# Patient Record
Sex: MALE | Race: WHITE | NOT HISPANIC OR LATINO | URBAN - METROPOLITAN AREA
[De-identification: names, ages, dates, MRNs, and addresses within clinical notes are randomized per-mention and may not be internally consistent; named-entity substitution may affect disease eponyms.]

---

## 2018-03-21 ENCOUNTER — OUTPATIENT (OUTPATIENT)
Dept: OUTPATIENT SERVICES | Facility: HOSPITAL | Age: 28
LOS: 1 days | End: 2018-03-21

## 2018-03-21 ENCOUNTER — APPOINTMENT (OUTPATIENT)
Dept: ULTRASOUND IMAGING | Facility: CLINIC | Age: 28
End: 2018-03-21
Payer: COMMERCIAL

## 2018-03-21 DIAGNOSIS — R10.30 LOWER ABDOMINAL PAIN, UNSPECIFIED: ICD-10-CM

## 2018-03-21 PROCEDURE — 76775 US EXAM ABDO BACK WALL LIM: CPT | Mod: 26

## 2018-06-27 ENCOUNTER — TRANSCRIPTION ENCOUNTER (OUTPATIENT)
Age: 28
End: 2018-06-27

## 2018-07-02 ENCOUNTER — APPOINTMENT (OUTPATIENT)
Dept: SURGERY | Facility: CLINIC | Age: 28
End: 2018-07-02
Payer: COMMERCIAL

## 2018-07-02 ENCOUNTER — FORM ENCOUNTER (OUTPATIENT)
Age: 28
End: 2018-07-02

## 2018-07-02 VITALS
HEART RATE: 81 BPM | TEMPERATURE: 97.9 F | DIASTOLIC BLOOD PRESSURE: 80 MMHG | HEIGHT: 67 IN | RESPIRATION RATE: 16 BRPM | BODY MASS INDEX: 44.18 KG/M2 | WEIGHT: 281.5 LBS | OXYGEN SATURATION: 97 % | SYSTOLIC BLOOD PRESSURE: 137 MMHG

## 2018-07-02 DIAGNOSIS — Z86.39 PERSONAL HISTORY OF OTHER ENDOCRINE, NUTRITIONAL AND METABOLIC DISEASE: ICD-10-CM

## 2018-07-02 DIAGNOSIS — Z80.1 FAMILY HISTORY OF MALIGNANT NEOPLASM OF TRACHEA, BRONCHUS AND LUNG: ICD-10-CM

## 2018-07-02 DIAGNOSIS — Z78.9 OTHER SPECIFIED HEALTH STATUS: ICD-10-CM

## 2018-07-02 DIAGNOSIS — K21.9 GASTRO-ESOPHAGEAL REFLUX DISEASE W/OUT ESOPHAGITIS: ICD-10-CM

## 2018-07-02 DIAGNOSIS — Z80.0 FAMILY HISTORY OF MALIGNANT NEOPLASM OF DIGESTIVE ORGANS: ICD-10-CM

## 2018-07-02 DIAGNOSIS — K08.409 PARTIAL LOSS OF TEETH, UNSPECIFIED CAUSE, UNSPECIFIED CLASS: ICD-10-CM

## 2018-07-02 DIAGNOSIS — Z86.79 PERSONAL HISTORY OF OTHER DISEASES OF THE CIRCULATORY SYSTEM: ICD-10-CM

## 2018-07-02 PROCEDURE — 99205 OFFICE O/P NEW HI 60 MIN: CPT

## 2018-07-03 ENCOUNTER — APPOINTMENT (OUTPATIENT)
Dept: ULTRASOUND IMAGING | Facility: CLINIC | Age: 28
End: 2018-07-03

## 2018-07-03 ENCOUNTER — NON-APPOINTMENT (OUTPATIENT)
Age: 28
End: 2018-07-03

## 2018-07-03 ENCOUNTER — OUTPATIENT (OUTPATIENT)
Dept: OUTPATIENT SERVICES | Facility: HOSPITAL | Age: 28
LOS: 1 days | End: 2018-07-03
Payer: COMMERCIAL

## 2018-07-03 ENCOUNTER — APPOINTMENT (OUTPATIENT)
Dept: CARDIOLOGY | Facility: CLINIC | Age: 28
End: 2018-07-03
Payer: COMMERCIAL

## 2018-07-03 VITALS
DIASTOLIC BLOOD PRESSURE: 87 MMHG | WEIGHT: 281 LBS | SYSTOLIC BLOOD PRESSURE: 131 MMHG | BODY MASS INDEX: 44.1 KG/M2 | HEART RATE: 77 BPM | HEIGHT: 67 IN | OXYGEN SATURATION: 96 %

## 2018-07-03 DIAGNOSIS — R94.31 ABNORMAL ELECTROCARDIOGRAM [ECG] [EKG]: ICD-10-CM

## 2018-07-03 DIAGNOSIS — Z60.2 PROBLEMS RELATED TO LIVING ALONE: ICD-10-CM

## 2018-07-03 DIAGNOSIS — F17.200 NICOTINE DEPENDENCE, UNSPECIFIED, UNCOMPLICATED: ICD-10-CM

## 2018-07-03 PROCEDURE — 93000 ELECTROCARDIOGRAM COMPLETE: CPT

## 2018-07-03 PROCEDURE — 76700 US EXAM ABDOM COMPLETE: CPT | Mod: 26

## 2018-07-03 PROCEDURE — 99204 OFFICE O/P NEW MOD 45 MIN: CPT

## 2018-07-03 SDOH — SOCIAL STABILITY - SOCIAL INSECURITY: PROBLEMS RELATED TO LIVING ALONE: Z60.2

## 2018-08-16 ENCOUNTER — OUTPATIENT (OUTPATIENT)
Dept: OUTPATIENT SERVICES | Facility: HOSPITAL | Age: 28
LOS: 1 days | End: 2018-08-16
Payer: COMMERCIAL

## 2018-08-16 DIAGNOSIS — G47.30 SLEEP APNEA, UNSPECIFIED: ICD-10-CM

## 2018-08-16 PROCEDURE — 95810 POLYSOM 6/> YRS 4/> PARAM: CPT

## 2018-08-16 PROCEDURE — 95810 POLYSOM 6/> YRS 4/> PARAM: CPT | Mod: 26

## 2018-09-18 ENCOUNTER — APPOINTMENT (OUTPATIENT)
Dept: PULMONOLOGY | Facility: CLINIC | Age: 28
End: 2018-09-18
Payer: COMMERCIAL

## 2018-10-22 ENCOUNTER — APPOINTMENT (OUTPATIENT)
Dept: PULMONOLOGY | Facility: CLINIC | Age: 28
End: 2018-10-22
Payer: COMMERCIAL

## 2018-10-22 VITALS
RESPIRATION RATE: 16 BRPM | HEART RATE: 78 BPM | HEIGHT: 67 IN | OXYGEN SATURATION: 98 % | WEIGHT: 286 LBS | SYSTOLIC BLOOD PRESSURE: 134 MMHG | BODY MASS INDEX: 44.89 KG/M2 | DIASTOLIC BLOOD PRESSURE: 84 MMHG

## 2018-10-22 DIAGNOSIS — Z01.818 ENCOUNTER FOR OTHER PREPROCEDURAL EXAMINATION: ICD-10-CM

## 2018-10-22 DIAGNOSIS — Z00.00 ENCOUNTER FOR GENERAL ADULT MEDICAL EXAMINATION W/OUT ABNORMAL FINDINGS: ICD-10-CM

## 2018-10-22 DIAGNOSIS — Z87.891 PERSONAL HISTORY OF NICOTINE DEPENDENCE: ICD-10-CM

## 2018-10-22 DIAGNOSIS — J45.990 EXERCISE INDUCED BRONCHOSPASM: ICD-10-CM

## 2018-10-22 DIAGNOSIS — E66.01 MORBID (SEVERE) OBESITY DUE TO EXCESS CALORIES: ICD-10-CM

## 2018-10-22 DIAGNOSIS — R06.02 SHORTNESS OF BREATH: ICD-10-CM

## 2018-10-22 DIAGNOSIS — R06.83 SNORING: ICD-10-CM

## 2018-10-22 DIAGNOSIS — G47.33 OBSTRUCTIVE SLEEP APNEA (ADULT) (PEDIATRIC): ICD-10-CM

## 2018-10-22 PROCEDURE — 94010 BREATHING CAPACITY TEST: CPT

## 2018-10-22 PROCEDURE — 94729 DIFFUSING CAPACITY: CPT

## 2018-10-22 PROCEDURE — 99204 OFFICE O/P NEW MOD 45 MIN: CPT | Mod: 25

## 2018-10-22 PROCEDURE — 85018 HEMOGLOBIN: CPT | Mod: QW

## 2018-10-22 PROCEDURE — 94727 GAS DIL/WSHOT DETER LNG VOL: CPT

## 2019-01-11 ENCOUNTER — APPOINTMENT (OUTPATIENT)
Dept: UROLOGY | Facility: CLINIC | Age: 29
End: 2019-01-11
Payer: COMMERCIAL

## 2019-01-11 VITALS
HEART RATE: 99 BPM | SYSTOLIC BLOOD PRESSURE: 140 MMHG | DIASTOLIC BLOOD PRESSURE: 95 MMHG | BODY MASS INDEX: 44.89 KG/M2 | WEIGHT: 286 LBS | TEMPERATURE: 98.6 F | OXYGEN SATURATION: 96 % | HEIGHT: 67 IN

## 2019-01-11 DIAGNOSIS — R10.9 UNSPECIFIED ABDOMINAL PAIN: ICD-10-CM

## 2019-01-11 DIAGNOSIS — R39.198 OTHER DIFFICULTIES WITH MICTURITION: ICD-10-CM

## 2019-01-11 LAB
BILIRUB UR QL STRIP: NEGATIVE
CLARITY UR: CLEAR
COLLECTION METHOD: NORMAL
GLUCOSE UR-MCNC: NEGATIVE
HCG UR QL: 0.2 EU/DL
HGB UR QL STRIP.AUTO: NEGATIVE
KETONES UR-MCNC: NEGATIVE
LEUKOCYTE ESTERASE UR QL STRIP: NEGATIVE
NITRITE UR QL STRIP: NEGATIVE
PH UR STRIP: 5.5
PROT UR STRIP-MCNC: NEGATIVE
SP GR UR STRIP: 1.01

## 2019-01-11 PROCEDURE — 99204 OFFICE O/P NEW MOD 45 MIN: CPT | Mod: 25

## 2019-01-11 PROCEDURE — 81003 URINALYSIS AUTO W/O SCOPE: CPT | Mod: QW

## 2019-01-11 NOTE — END OF VISIT
[FreeTextEntry3] : He will start a course of antibiotics and one-week course of alpha blockade with tamsulosin. A CAT scan is ordered and he will followup here in one week or as

## 2019-01-11 NOTE — HISTORY OF PRESENT ILLNESS
[FreeTextEntry1] : This patient had back and flank pain of limited duration on the left side one month ago. More recently developed pain on urination and notes extreme discomfort in the penis intermittently. He denies any recent sexual relation

## 2019-01-11 NOTE — REVIEW OF SYSTEMS
[Pain during urination] : pain during urination [Joint Pain] : joint pain [Swollen Glands] : swollen glands [see HPI] : see HPI [Negative] : Heme/Lymph

## 2019-01-12 LAB
APPEARANCE: CLEAR
BACTERIA: NEGATIVE
BILIRUBIN URINE: NEGATIVE
BLOOD URINE: NEGATIVE
COLOR: YELLOW
GLUCOSE QUALITATIVE U: NEGATIVE MG/DL
HYALINE CASTS: 0 /LPF
KETONES URINE: NEGATIVE
LEUKOCYTE ESTERASE URINE: NEGATIVE
MICROSCOPIC-UA: NORMAL
NITRITE URINE: NEGATIVE
PH URINE: 5.5
PROTEIN URINE: NEGATIVE MG/DL
RED BLOOD CELLS URINE: 0 /HPF
SPECIFIC GRAVITY URINE: 1.01
SQUAMOUS EPITHELIAL CELLS: 0 /HPF
UROBILINOGEN URINE: NEGATIVE MG/DL
WHITE BLOOD CELLS URINE: 0 /HPF

## 2019-01-13 ENCOUNTER — FORM ENCOUNTER (OUTPATIENT)
Age: 29
End: 2019-01-13

## 2019-01-14 ENCOUNTER — LABORATORY RESULT (OUTPATIENT)
Age: 29
End: 2019-01-14

## 2019-01-14 ENCOUNTER — OUTPATIENT (OUTPATIENT)
Dept: OUTPATIENT SERVICES | Facility: HOSPITAL | Age: 29
LOS: 1 days | End: 2019-01-14
Payer: COMMERCIAL

## 2019-01-14 ENCOUNTER — APPOINTMENT (OUTPATIENT)
Dept: CT IMAGING | Facility: CLINIC | Age: 29
End: 2019-01-14

## 2019-01-14 DIAGNOSIS — N48.89 OTHER SPECIFIED DISORDERS OF PENIS: ICD-10-CM

## 2019-01-14 LAB — BACTERIA UR CULT: NORMAL

## 2019-01-14 PROCEDURE — 74178 CT ABD&PLV WO CNTR FLWD CNTR: CPT | Mod: 26

## 2019-01-16 ENCOUNTER — APPOINTMENT (OUTPATIENT)
Age: 29
End: 2019-01-16
Payer: COMMERCIAL

## 2019-01-16 DIAGNOSIS — K59.4 ANAL SPASM: ICD-10-CM

## 2019-01-16 PROCEDURE — 99213 OFFICE O/P EST LOW 20 MIN: CPT

## 2019-01-16 NOTE — REVIEW OF SYSTEMS
[see HPI] : see HPI [Pain during urination] : pain during urination [Joint Pain] : joint pain [Swollen Glands] : swollen glands [Negative] : Heme/Lymph

## 2019-01-16 NOTE — HISTORY OF PRESENT ILLNESS
[FreeTextEntry1] : This patient continues to note pain in the penis.  This CAT scan and urine are completely normal and he remains on antibiotics. He states that he has not noted any relief at all. He states that his urinary stream is good but that there is some dribbling out the side as he urinates

## 2019-01-16 NOTE — ASSESSMENT
[FreeTextEntry1] : I will start alprazolam in case this is a manifestation of levator syndrome and have recommended surgery fluoroscopy to be done tomorrow to make sure there is not a stone or distal urethral stricture

## 2019-01-17 ENCOUNTER — APPOINTMENT (OUTPATIENT)
Age: 29
End: 2019-01-17
Payer: COMMERCIAL

## 2019-01-17 VITALS — SYSTOLIC BLOOD PRESSURE: 133 MMHG | HEART RATE: 98 BPM | DIASTOLIC BLOOD PRESSURE: 87 MMHG

## 2019-01-17 DIAGNOSIS — N48.89 OTHER SPECIFIED DISORDERS OF PENIS: ICD-10-CM

## 2019-01-17 PROCEDURE — 52000 CYSTOURETHROSCOPY: CPT

## 2019-01-22 ENCOUNTER — APPOINTMENT (OUTPATIENT)
Age: 29
End: 2019-01-22
Payer: COMMERCIAL

## 2019-02-07 ENCOUNTER — APPOINTMENT (OUTPATIENT)
Dept: DERMATOLOGY | Facility: CLINIC | Age: 29
End: 2019-02-07
Payer: COMMERCIAL

## 2019-02-07 PROCEDURE — 99203 OFFICE O/P NEW LOW 30 MIN: CPT

## 2019-11-19 ENCOUNTER — APPOINTMENT (OUTPATIENT)
Dept: ULTRASOUND IMAGING | Facility: CLINIC | Age: 29
End: 2019-11-19
Payer: COMMERCIAL

## 2019-11-19 ENCOUNTER — OUTPATIENT (OUTPATIENT)
Dept: OUTPATIENT SERVICES | Facility: HOSPITAL | Age: 29
LOS: 1 days | End: 2019-11-19

## 2019-11-19 ENCOUNTER — TRANSCRIPTION ENCOUNTER (OUTPATIENT)
Age: 29
End: 2019-11-19

## 2019-11-19 DIAGNOSIS — R10.11 RIGHT UPPER QUADRANT PAIN: ICD-10-CM

## 2019-11-19 PROCEDURE — 76700 US EXAM ABDOM COMPLETE: CPT | Mod: 26

## 2019-11-20 ENCOUNTER — APPOINTMENT (OUTPATIENT)
Dept: GASTROENTEROLOGY | Facility: CLINIC | Age: 29
End: 2019-11-20
Payer: COMMERCIAL

## 2019-11-20 ENCOUNTER — TRANSCRIPTION ENCOUNTER (OUTPATIENT)
Age: 29
End: 2019-11-20

## 2019-11-20 VITALS
BODY MASS INDEX: 35.79 KG/M2 | RESPIRATION RATE: 16 BRPM | DIASTOLIC BLOOD PRESSURE: 82 MMHG | HEIGHT: 67 IN | SYSTOLIC BLOOD PRESSURE: 124 MMHG | HEART RATE: 86 BPM | OXYGEN SATURATION: 98 % | WEIGHT: 228 LBS

## 2019-11-20 DIAGNOSIS — K80.50 CALCULUS OF BILE DUCT W/OUT CHOLANGITIS OR CHOLECYSTITIS W/OUT OBSTRUCTION: ICD-10-CM

## 2019-11-20 DIAGNOSIS — K80.20 CALCULUS OF GALLBLADDER W/OUT CHOLECYSTITIS W/OUT OBSTRUCTION: ICD-10-CM

## 2019-11-20 PROCEDURE — 99204 OFFICE O/P NEW MOD 45 MIN: CPT

## 2019-11-20 RX ORDER — CETIRIZINE HYDROCHLORIDE 10 MG/1
CAPSULE, LIQUID FILLED ORAL
Refills: 0 | Status: COMPLETED | COMMUNITY
End: 2019-11-20

## 2019-11-20 RX ORDER — CIPROFLOXACIN HYDROCHLORIDE 500 MG/1
500 TABLET, FILM COATED ORAL
Qty: 20 | Refills: 0 | Status: COMPLETED | COMMUNITY
Start: 2019-01-11 | End: 2019-11-20

## 2019-11-20 RX ORDER — ALPRAZOLAM 0.5 MG/1
0.5 TABLET ORAL TWICE DAILY
Qty: 60 | Refills: 0 | Status: COMPLETED | COMMUNITY
Start: 2019-01-16 | End: 2019-11-20

## 2019-11-20 RX ORDER — DEXTROAMPHETAMINE SACCHARATE, AMPHETAMINE ASPARTATE, DEXTROAMPHETAMINE SULFATE, AND AMPHETAMINE SULFATE 7.5; 7.5; 7.5; 7.5 MG/1; MG/1; MG/1; MG/1
30 TABLET ORAL
Refills: 0 | Status: ACTIVE | COMMUNITY

## 2019-11-20 NOTE — HISTORY OF PRESENT ILLNESS
[de-identified] : The patient arrived for consultation for abdominal pain. In the last 3 weeks he has episodes of 10-15 minutes of epigastric pain and which was radiating to the scapula. He denied any nausea or vomiting. He underwent ultrasound of abdomen which revealed cholelithiasis. The gallbladder was contracted. He has no new labs. He has a history of obesity and has lost about 65 pounds. He is on Adderall. No previous EGD or colonoscopy. Denies any toxic habits. Now for routine NSAID use. No melena or hematochezia.

## 2019-11-20 NOTE — PHYSICAL EXAM
[General Appearance - Alert] : alert [Sclera] : the sclera and conjunctiva were normal [General Appearance - In No Acute Distress] : in no acute distress [Outer Ear] : the ears and nose were normal in appearance [PERRL With Normal Accommodation] : pupils were equal in size, round, and reactive to light [Extraocular Movements] : extraocular movements were intact [Neck Appearance] : the appearance of the neck was normal [Oropharynx] : the oropharynx was normal [Jugular Venous Distention Increased] : there was no jugular-venous distention [Neck Cervical Mass (___cm)] : no neck mass was observed [Thyroid Diffuse Enlargement] : the thyroid was not enlarged [Thyroid Nodule] : there were no palpable thyroid nodules [Heart Rate And Rhythm] : heart rate was normal and rhythm regular [Auscultation Breath Sounds / Voice Sounds] : lungs were clear to auscultation bilaterally [Heart Sounds] : normal S1 and S2 [Heart Sounds Gallop] : no gallops [Heart Sounds Pericardial Friction Rub] : no pericardial rub [Murmurs] : no murmurs [Full Pulse] : the pedal pulses are present [Edema] : there was no peripheral edema [Abdomen Soft] : soft [Abdomen Tenderness] : non-tender [Bowel Sounds] : normal bowel sounds [Abdomen Mass (___ Cm)] : no abdominal mass palpated [Cervical Lymph Nodes Enlarged Posterior Bilaterally] : posterior cervical [Cervical Lymph Nodes Enlarged Anterior Bilaterally] : anterior cervical [Supraclavicular Lymph Nodes Enlarged Bilaterally] : supraclavicular [No CVA Tenderness] : no ~M costovertebral angle tenderness [No Spinal Tenderness] : no spinal tenderness [Musculoskeletal - Swelling] : no joint swelling seen [Nail Clubbing] : no clubbing  or cyanosis of the fingernails [Abnormal Walk] : normal gait [Skin Color & Pigmentation] : normal skin color and pigmentation [Motor Tone] : muscle strength and tone were normal [] : no rash [Skin Turgor] : normal skin turgor [No Focal Deficits] : no focal deficits [Oriented To Time, Place, And Person] : oriented to person, place, and time [Impaired Insight] : insight and judgment were intact [Affect] : the affect was normal

## 2019-11-20 NOTE — ASSESSMENT
[FreeTextEntry1] : Most likely the patient has biliary colic. As he has recurrent  biliary colic, I am recommending to consider labs and possible surgical evaluation for consideration for cholecystectomy. If needed we will schedule him for an upper endoscopy.\par \par Yousif Linares MD\par Gastroenterology \par \par

## 2019-11-22 LAB
ALBUMIN SERPL ELPH-MCNC: 4.6 G/DL
ALP BLD-CCNC: 77 U/L
ALT SERPL-CCNC: 23 U/L
ANION GAP SERPL CALC-SCNC: 12 MMOL/L
AST SERPL-CCNC: 19 U/L
BASOPHILS # BLD AUTO: 0.06 K/UL
BASOPHILS NFR BLD AUTO: 0.7 %
BILIRUB SERPL-MCNC: 0.4 MG/DL
BUN SERPL-MCNC: 15 MG/DL
CALCIUM SERPL-MCNC: 9.8 MG/DL
CHLORIDE SERPL-SCNC: 105 MMOL/L
CO2 SERPL-SCNC: 25 MMOL/L
CREAT SERPL-MCNC: 0.92 MG/DL
EOSINOPHIL # BLD AUTO: 0.08 K/UL
EOSINOPHIL NFR BLD AUTO: 1 %
GLUCOSE SERPL-MCNC: 88 MG/DL
HCT VFR BLD CALC: 47.2 %
HGB BLD-MCNC: 15.8 G/DL
IMM GRANULOCYTES NFR BLD AUTO: 0.6 %
INR PPP: 0.96 RATIO
LYMPHOCYTES # BLD AUTO: 2.45 K/UL
LYMPHOCYTES NFR BLD AUTO: 29.7 %
MAN DIFF?: NORMAL
MCHC RBC-ENTMCNC: 30.1 PG
MCHC RBC-ENTMCNC: 33.5 GM/DL
MCV RBC AUTO: 89.9 FL
MONOCYTES # BLD AUTO: 0.64 K/UL
MONOCYTES NFR BLD AUTO: 7.7 %
NEUTROPHILS # BLD AUTO: 4.98 K/UL
NEUTROPHILS NFR BLD AUTO: 60.3 %
PLATELET # BLD AUTO: 301 K/UL
POTASSIUM SERPL-SCNC: 4.8 MMOL/L
PROT SERPL-MCNC: 7.3 G/DL
PT BLD: 10.9 SEC
RBC # BLD: 5.25 M/UL
RBC # FLD: 12.2 %
SODIUM SERPL-SCNC: 142 MMOL/L
WBC # FLD AUTO: 8.26 K/UL

## 2019-11-25 ENCOUNTER — APPOINTMENT (OUTPATIENT)
Dept: SURGERY | Facility: CLINIC | Age: 29
End: 2019-11-25
Payer: COMMERCIAL

## 2019-11-25 VITALS
TEMPERATURE: 97.8 F | HEART RATE: 83 BPM | DIASTOLIC BLOOD PRESSURE: 86 MMHG | BODY MASS INDEX: 37.04 KG/M2 | HEIGHT: 67 IN | WEIGHT: 236 LBS | SYSTOLIC BLOOD PRESSURE: 146 MMHG | OXYGEN SATURATION: 98 %

## 2019-11-25 PROCEDURE — 99213 OFFICE O/P EST LOW 20 MIN: CPT

## 2019-11-25 NOTE — ASSESSMENT
[FreeTextEntry1] : Mr. WADE is a 29 year man with symptomatic cholelithiasis. We discussed the options of laparoscopic cholecystectomy vs nonoperative management. The risks benefits and alternatives were discussed at length and all questions were answered. The patient appears to understand, wishes to proceed, and consents to procedure.\par

## 2019-11-25 NOTE — HISTORY OF PRESENT ILLNESS
[de-identified] : Mr. WADE is a 29 year man with episodic right upper quadrant pain for 1 month who presents for evaluation. He reports 4 episodes of right upper quadrant pain, which have each resolved spontaneously. Denies pain radiating to back. Pt is unsure if pain is associated with meals. Today, the patient denies pain. Denies fever/chills/nausea/emesis, denies changes in bowel or bladder habits. Tolerating diet, reports normal bowel movements. \par \par Past surgical history: denies\par \par Right upper quadrant ultrasound: contracted gallbladder with cholelithiasis\par

## 2019-11-25 NOTE — PHYSICAL EXAM
[JVD] : no jugular venous distention  [No Rash or Lesion] : No rash or lesion [Alert] : alert [Oriented to Person] : oriented to person [Oriented to Place] : oriented to place [Oriented to Time] : oriented to time [Calm] : calm [de-identified] : No acute distress [de-identified] : No respiratory distress [de-identified] : soft, nontender. no rebound or guarding. Negative Youssef's sign [de-identified] : Regular rate [de-identified] : normal range of motion

## 2019-11-25 NOTE — CONSULT LETTER
[Dear  ___] : Dear  [unfilled], [Consult Letter:] : I had the pleasure of evaluating your patient, [unfilled]. [Please see my note below.] : Please see my note below. [Consult Closing:] : Thank you very much for allowing me to participate in the care of this patient.  If you have any questions, please do not hesitate to contact me. [Sincerely,] : Sincerely, [FreeTextEntry3] : Elio Geiger MD\par General, Laparoscopic, and Bariatric Surgery\par [facility]\par

## 2019-11-27 ENCOUNTER — OUTPATIENT (OUTPATIENT)
Dept: OUTPATIENT SERVICES | Facility: HOSPITAL | Age: 29
LOS: 1 days | End: 2019-11-27
Payer: COMMERCIAL

## 2019-11-27 VITALS
SYSTOLIC BLOOD PRESSURE: 123 MMHG | DIASTOLIC BLOOD PRESSURE: 80 MMHG | TEMPERATURE: 98 F | HEART RATE: 91 BPM | WEIGHT: 227.3 LBS | HEIGHT: 65 IN | RESPIRATION RATE: 16 BRPM

## 2019-11-27 DIAGNOSIS — Z29.9 ENCOUNTER FOR PROPHYLACTIC MEASURES, UNSPECIFIED: ICD-10-CM

## 2019-11-27 DIAGNOSIS — Z98.890 OTHER SPECIFIED POSTPROCEDURAL STATES: Chronic | ICD-10-CM

## 2019-11-27 DIAGNOSIS — K80.20 CALCULUS OF GALLBLADDER WITHOUT CHOLECYSTITIS WITHOUT OBSTRUCTION: ICD-10-CM

## 2019-11-27 DIAGNOSIS — Z13.89 ENCOUNTER FOR SCREENING FOR OTHER DISORDER: ICD-10-CM

## 2019-11-27 DIAGNOSIS — Z01.818 ENCOUNTER FOR OTHER PREPROCEDURAL EXAMINATION: ICD-10-CM

## 2019-11-27 DIAGNOSIS — F90.9 ATTENTION-DEFICIT HYPERACTIVITY DISORDER, UNSPECIFIED TYPE: ICD-10-CM

## 2019-11-27 LAB
ALBUMIN SERPL ELPH-MCNC: 4.9 G/DL — SIGNIFICANT CHANGE UP (ref 3.3–5.2)
ALP SERPL-CCNC: 79 U/L — SIGNIFICANT CHANGE UP (ref 40–120)
ALT FLD-CCNC: 22 U/L — SIGNIFICANT CHANGE UP
AMYLASE P1 CFR SERPL: 39 U/L — SIGNIFICANT CHANGE UP (ref 36–128)
ANION GAP SERPL CALC-SCNC: 14 MMOL/L — SIGNIFICANT CHANGE UP (ref 5–17)
AST SERPL-CCNC: 21 U/L — SIGNIFICANT CHANGE UP
BILIRUB SERPL-MCNC: 0.9 MG/DL — SIGNIFICANT CHANGE UP (ref 0.4–2)
BUN SERPL-MCNC: 14 MG/DL — SIGNIFICANT CHANGE UP (ref 8–20)
CALCIUM SERPL-MCNC: 10 MG/DL — SIGNIFICANT CHANGE UP (ref 8.6–10.2)
CHLORIDE SERPL-SCNC: 99 MMOL/L — SIGNIFICANT CHANGE UP (ref 98–107)
CO2 SERPL-SCNC: 26 MMOL/L — SIGNIFICANT CHANGE UP (ref 22–29)
CREAT SERPL-MCNC: 0.79 MG/DL — SIGNIFICANT CHANGE UP (ref 0.5–1.3)
GLUCOSE SERPL-MCNC: 78 MG/DL — SIGNIFICANT CHANGE UP (ref 70–115)
HCT VFR BLD CALC: 48.5 % — SIGNIFICANT CHANGE UP (ref 39–50)
HGB BLD-MCNC: 15.8 G/DL — SIGNIFICANT CHANGE UP (ref 13–17)
LIDOCAIN IGE QN: 12 U/L — LOW (ref 22–51)
MCHC RBC-ENTMCNC: 29.2 PG — SIGNIFICANT CHANGE UP (ref 27–34)
MCHC RBC-ENTMCNC: 32.6 GM/DL — SIGNIFICANT CHANGE UP (ref 32–36)
MCV RBC AUTO: 89.6 FL — SIGNIFICANT CHANGE UP (ref 80–100)
PLATELET # BLD AUTO: 330 K/UL — SIGNIFICANT CHANGE UP (ref 150–400)
POTASSIUM SERPL-MCNC: 4.3 MMOL/L — SIGNIFICANT CHANGE UP (ref 3.5–5.3)
POTASSIUM SERPL-SCNC: 4.3 MMOL/L — SIGNIFICANT CHANGE UP (ref 3.5–5.3)
PROT SERPL-MCNC: 7.8 G/DL — SIGNIFICANT CHANGE UP (ref 6.6–8.7)
RBC # BLD: 5.41 M/UL — SIGNIFICANT CHANGE UP (ref 4.2–5.8)
RBC # FLD: 11.9 % — SIGNIFICANT CHANGE UP (ref 10.3–14.5)
SODIUM SERPL-SCNC: 139 MMOL/L — SIGNIFICANT CHANGE UP (ref 135–145)
WBC # BLD: 7.71 K/UL — SIGNIFICANT CHANGE UP (ref 3.8–10.5)
WBC # FLD AUTO: 7.71 K/UL — SIGNIFICANT CHANGE UP (ref 3.8–10.5)

## 2019-11-27 PROCEDURE — G0463: CPT

## 2019-11-27 PROCEDURE — 85027 COMPLETE CBC AUTOMATED: CPT

## 2019-11-27 PROCEDURE — 80053 COMPREHEN METABOLIC PANEL: CPT

## 2019-11-27 PROCEDURE — 83690 ASSAY OF LIPASE: CPT

## 2019-11-27 PROCEDURE — 36415 COLL VENOUS BLD VENIPUNCTURE: CPT

## 2019-11-27 PROCEDURE — 82150 ASSAY OF AMYLASE: CPT

## 2019-11-27 RX ORDER — CEFAZOLIN SODIUM 1 G
2000 VIAL (EA) INJECTION ONCE
Refills: 0 | Status: COMPLETED | OUTPATIENT
Start: 2019-12-04 | End: 2019-12-04

## 2019-11-27 RX ORDER — SODIUM CHLORIDE 9 MG/ML
3 INJECTION INTRAMUSCULAR; INTRAVENOUS; SUBCUTANEOUS ONCE
Refills: 0 | Status: DISCONTINUED | OUTPATIENT
Start: 2019-12-04 | End: 2020-01-05

## 2019-11-27 NOTE — H&P PST ADULT - NSICDXPROBLEM_GEN_ALL_CORE_FT
PROBLEM DIAGNOSES  Problem: Screening for substance abuse  Assessment and Plan: moderate risk for opioid abuse    Problem: ADHD  Assessment and Plan: continue medications as instructed.     Problem: Need for prophylactic measure  Assessment and Plan: Caprini Score 2 Low Risk, Surgical team should assess for VTE Prophylaxis     Problem: GS (gallstone)  Assessment and Plan: Laparoscopic possible open cholecystectomy.

## 2019-11-27 NOTE — H&P PST ADULT - ATTENDING COMMENTS
Note reviewed  Plan for laparoscopic cholecystectomy  Risks/benefits discussed. Patient understands, agrees, and consents to procedure.

## 2019-11-27 NOTE — H&P PST ADULT - HISTORY OF PRESENT ILLNESS
29 year old male who states that he had epigastric  pain, nausea, chills started a month ago, he had 5 episodes with a month last was on last Saturday, Ultra sound showed gall stones and sludge.

## 2019-12-03 ENCOUNTER — TRANSCRIPTION ENCOUNTER (OUTPATIENT)
Age: 29
End: 2019-12-03

## 2019-12-04 ENCOUNTER — RESULT REVIEW (OUTPATIENT)
Age: 29
End: 2019-12-04

## 2019-12-04 ENCOUNTER — OUTPATIENT (OUTPATIENT)
Dept: INPATIENT UNIT | Facility: HOSPITAL | Age: 29
LOS: 1 days | End: 2019-12-04
Payer: COMMERCIAL

## 2019-12-04 VITALS
WEIGHT: 227.96 LBS | TEMPERATURE: 98 F | SYSTOLIC BLOOD PRESSURE: 131 MMHG | RESPIRATION RATE: 18 BRPM | HEIGHT: 68 IN | OXYGEN SATURATION: 99 % | DIASTOLIC BLOOD PRESSURE: 76 MMHG | HEART RATE: 83 BPM

## 2019-12-04 VITALS
DIASTOLIC BLOOD PRESSURE: 88 MMHG | TEMPERATURE: 98 F | RESPIRATION RATE: 17 BRPM | OXYGEN SATURATION: 99 % | HEART RATE: 90 BPM | SYSTOLIC BLOOD PRESSURE: 141 MMHG

## 2019-12-04 DIAGNOSIS — K80.20 CALCULUS OF GALLBLADDER WITHOUT CHOLECYSTITIS WITHOUT OBSTRUCTION: ICD-10-CM

## 2019-12-04 DIAGNOSIS — Z98.890 OTHER SPECIFIED POSTPROCEDURAL STATES: Chronic | ICD-10-CM

## 2019-12-04 PROCEDURE — 88304 TISSUE EXAM BY PATHOLOGIST: CPT | Mod: 26

## 2019-12-04 PROCEDURE — 47562 LAPAROSCOPIC CHOLECYSTECTOMY: CPT

## 2019-12-04 PROCEDURE — 88304 TISSUE EXAM BY PATHOLOGIST: CPT

## 2019-12-04 RX ORDER — ONDANSETRON 8 MG/1
4 TABLET, FILM COATED ORAL ONCE
Refills: 0 | Status: COMPLETED | OUTPATIENT
Start: 2019-12-04 | End: 2019-12-04

## 2019-12-04 RX ORDER — SODIUM CHLORIDE 9 MG/ML
1000 INJECTION, SOLUTION INTRAVENOUS
Refills: 0 | Status: DISCONTINUED | OUTPATIENT
Start: 2019-12-04 | End: 2019-12-04

## 2019-12-04 RX ORDER — TRAMADOL HYDROCHLORIDE 50 MG/1
1 TABLET ORAL
Qty: 4 | Refills: 0
Start: 2019-12-04 | End: 2019-12-04

## 2019-12-04 RX ORDER — FENTANYL CITRATE 50 UG/ML
50 INJECTION INTRAVENOUS
Refills: 0 | Status: DISCONTINUED | OUTPATIENT
Start: 2019-12-04 | End: 2019-12-04

## 2019-12-04 RX ORDER — DEXTROAMPHETAMINE SACCHARATE, AMPHETAMINE ASPARTATE, DEXTROAMPHETAMINE SULFATE AND AMPHETAMINE SULFATE 1.875; 1.875; 1.875; 1.875 MG/1; MG/1; MG/1; MG/1
1 TABLET ORAL
Qty: 0 | Refills: 0 | DISCHARGE

## 2019-12-04 RX ADMIN — Medication 100 MILLIGRAM(S): at 13:30

## 2019-12-04 RX ADMIN — ONDANSETRON 4 MILLIGRAM(S): 8 TABLET, FILM COATED ORAL at 15:45

## 2019-12-04 RX ADMIN — FENTANYL CITRATE 50 MICROGRAM(S): 50 INJECTION INTRAVENOUS at 15:44

## 2019-12-04 NOTE — ASU DISCHARGE PLAN (ADULT/PEDIATRIC) - ASU DC SPECIAL INSTRUCTIONSFT
You can take tylenol 650mg every 8 hours alternating with alleve.   For severe pain, you have been prescribed tramadol 50mg.

## 2019-12-04 NOTE — ASU DISCHARGE PLAN (ADULT/PEDIATRIC) - CARE PROVIDER_API CALL
Elio Geiger)  Surgery  Bariatric  [address] [place] [address] [id number]  Phone: [phone]  Fax: [phone]  Follow Up Time: 2 weeks

## 2019-12-04 NOTE — BRIEF OPERATIVE NOTE - OPERATION/FINDINGS
critical view of safety achieved. cystic duct and cystic artery clipped and cut without  issues. hemostasis achieved. port sites closed with 4-0 monocryl.

## 2019-12-05 PROBLEM — F90.9 ATTENTION-DEFICIT HYPERACTIVITY DISORDER, UNSPECIFIED TYPE: Chronic | Status: ACTIVE | Noted: 2019-11-27

## 2019-12-05 PROBLEM — J30.2 OTHER SEASONAL ALLERGIC RHINITIS: Chronic | Status: ACTIVE | Noted: 2019-11-27

## 2019-12-07 LAB — SURGICAL PATHOLOGY STUDY: SIGNIFICANT CHANGE UP

## 2019-12-11 ENCOUNTER — APPOINTMENT (OUTPATIENT)
Dept: SURGERY | Facility: CLINIC | Age: 29
End: 2019-12-11
Payer: COMMERCIAL

## 2019-12-11 VITALS
SYSTOLIC BLOOD PRESSURE: 130 MMHG | TEMPERATURE: 98.1 F | BODY MASS INDEX: 36.1 KG/M2 | WEIGHT: 230 LBS | HEIGHT: 67 IN | DIASTOLIC BLOOD PRESSURE: 86 MMHG | OXYGEN SATURATION: 99 % | HEART RATE: 81 BPM

## 2019-12-11 PROCEDURE — 99024 POSTOP FOLLOW-UP VISIT: CPT

## 2019-12-11 NOTE — HISTORY OF PRESENT ILLNESS
[de-identified] : 29 year year old man who presented with symptomatic cholelithiasis s/p laparoscopic cholecystectomy on 12/4/19  who comes in today for postop visit. He is doing well. Denies pain. Denies fever/chills. No redness or pain at incision sites. Tolerating diet. Normal bowel movements.\par \par Pathology reviewed

## 2019-12-11 NOTE — ASSESSMENT
[FreeTextEntry1] : Mr. WADE is a 29 year man who presented with symptomatic cholelithiasis s/p laparoscopic cholecystectomy on 12/4/19 who comes in today for postop visit. He is doing well. Denies pain. Denies fever/chills. No redness or pain at incision sites. Tolerating diet. Normal bowel movements.\par

## 2019-12-11 NOTE — PHYSICAL EXAM
[JVD] : no jugular venous distention  [No Rash or Lesion] : No rash or lesion [Alert] : alert [Oriented to Person] : oriented to person [Oriented to Place] : oriented to place [de-identified] : No acute distress [Calm] : calm [Oriented to Time] : oriented to time [de-identified] : No respiratory distress [de-identified] : soft, nontender. no rebound or guarding. incisions c/d/i.  [de-identified] : Regular rate [de-identified] : normal range of motion

## 2020-04-26 ENCOUNTER — MESSAGE (OUTPATIENT)
Age: 30
End: 2020-04-26

## 2020-05-06 ENCOUNTER — APPOINTMENT (OUTPATIENT)
Dept: DISASTER EMERGENCY | Facility: CLINIC | Age: 30
End: 2020-05-06

## 2020-05-07 LAB
SARS-COV-2 IGG SERPL IA-ACNC: <0.1 INDEX
SARS-COV-2 IGG SERPL QL IA: NEGATIVE

## 2020-06-25 ENCOUNTER — APPOINTMENT (OUTPATIENT)
Dept: NEUROLOGY | Facility: CLINIC | Age: 30
End: 2020-06-25

## 2020-07-11 ENCOUNTER — TRANSCRIPTION ENCOUNTER (OUTPATIENT)
Age: 30
End: 2020-07-11

## 2020-09-14 ENCOUNTER — APPOINTMENT (OUTPATIENT)
Dept: NEUROLOGY | Facility: CLINIC | Age: 30
End: 2020-09-14
Payer: COMMERCIAL

## 2020-09-14 VITALS
HEIGHT: 68 IN | SYSTOLIC BLOOD PRESSURE: 126 MMHG | DIASTOLIC BLOOD PRESSURE: 87 MMHG | WEIGHT: 225 LBS | HEART RATE: 83 BPM | TEMPERATURE: 98.4 F | BODY MASS INDEX: 34.1 KG/M2

## 2020-09-14 DIAGNOSIS — R41.3 OTHER AMNESIA: ICD-10-CM

## 2020-09-14 PROCEDURE — 99204 OFFICE O/P NEW MOD 45 MIN: CPT

## 2020-09-14 NOTE — PHYSICAL EXAM
[General Appearance - Alert] : alert [General Appearance - In No Acute Distress] : in no acute distress [Affect] : the affect was normal [Oriented To Time, Place, And Person] : oriented to person, place, and time [Impaired Insight] : insight and judgment were intact [Person] : oriented to person [Place] : oriented to place [Time] : oriented to time [Concentration Intact] : normal concentrating ability [Span Intact] : the attention span was normal [Visual Intact] : visual attention was ~T not ~L decreased [Naming Objects] : no difficulty naming common objects [Repeating Phrases] : no difficulty repeating a phrase [Writing A Sentence] : no difficulty writing a sentence [Fluency] : fluency intact [Reading] : reading intact [Comprehension] : comprehension intact [Past History] : adequate knowledge of personal past history [Current Events] : adequate knowledge of current events [Cranial Nerves Optic (II)] : visual acuity intact bilaterally,  visual fields full to confrontation, pupils equal round and reactive to light [Cranial Nerves Trigeminal (V)] : facial sensation intact symmetrically [Cranial Nerves Oculomotor (III)] : extraocular motion intact [Cranial Nerves Vestibulocochlear (VIII)] : hearing was intact bilaterally [Cranial Nerves Facial (VII)] : face symmetrical [Cranial Nerves Accessory (XI - Cranial And Spinal)] : head turning and shoulder shrug symmetric [Cranial Nerves Glossopharyngeal (IX)] : tongue and palate midline [Cranial Nerves Hypoglossal (XII)] : there was no tongue deviation with protrusion [Motor Tone] : muscle tone was normal in all four extremities [Motor Strength] : muscle strength was normal in all four extremities [No Muscle Atrophy] : normal bulk in all four extremities [Motor Handedness Right-Handed] : the patient is right hand dominant [Paresis Pronator Drift Right-Sided] : no pronator drift on the right [Paresis Pronator Drift Left-Sided] : no pronator drift on the left [Sensation Tactile Decrease] : light touch was intact [Abnormal Walk] : normal gait [Balance] : balance was intact [Past-pointing] : there was no past-pointing [Coordination - Dysmetria Impaired Finger-to-Nose Bilateral] : not present [Tremor] : no tremor present [Dysdiadochokinesia Bilaterally] : not present [Coordination - Dysmetria Impaired Heel-to-Shin Bilateral] : not present [Plantar Reflex Left Only] : normal on the left [2+] : Ankle jerk left 2+ [Plantar Reflex Right Only] : normal on the right [Sclera] : the sclera and conjunctiva were normal [PERRL With Normal Accommodation] : pupils were equal in size, round, reactive to light, with normal accommodation [Extraocular Movements] : extraocular movements were intact [Neck Appearance] : the appearance of the neck was normal [Full Visual Field] : full visual field [] : the neck was supple

## 2020-09-14 NOTE — HISTORY OF PRESENT ILLNESS
[FreeTextEntry1] : 30 year old man hx of obesity, ADD, c/o worsening memory, inattentiveness, which his fiancee has been concerned. Has been diagnosed in second grade with ADD, for the last year and half treated with Adderall 20 mg in AM and 10 mg in early afternoon. Works well. Denies difficulty at work.\par No history of psychiatric disorder, thyroid disease, LEIDY, DM, HTN, heart disease. No hx of seizures, blackouts.\par

## 2020-09-14 NOTE — REVIEW OF SYSTEMS
[Memory Lapses or Loss] : memory loss [Confused or Disoriented] : no confusion [As Noted in HPI] : as noted in HPI [Changed Thought Patterns] : no change in thought patterns [Decr. Concentrating Ability] : decreased concentrating ability [Facial Weakness] : no facial weakness [Arm Weakness] : no arm weakness [Hand Weakness] : no hand weakness [Leg Weakness] : no leg weakness [Poor Coordination] : good coordination [Difficulty Writing] : no difficulty writing [Difficulties in Speech] : no speech difficulties [Abnormal Sensation] : no abnormal sensation [Numbness] : no numbness [Seizures] : no convulsions [Fainting] : no fainting [Frequent Falls] : not falling [Migraine Headache] : no migraine headache [Negative] : Heme/Lymph

## 2020-09-14 NOTE — DISCUSSION/SUMMARY
[FreeTextEntry1] : 30 year old man c/o increasing difficulty with memory, unremarkable exam possible ADD, r/o SOL, less likely seizures.\par Plan: MR head wo\par EEG\par B12, folate, TSH\par RTO 1 month

## 2020-11-05 ENCOUNTER — APPOINTMENT (OUTPATIENT)
Dept: NEUROLOGY | Facility: CLINIC | Age: 30
End: 2020-11-05

## 2020-11-11 ENCOUNTER — TRANSCRIPTION ENCOUNTER (OUTPATIENT)
Age: 30
End: 2020-11-11

## 2020-11-18 ENCOUNTER — APPOINTMENT (OUTPATIENT)
Dept: NEUROLOGY | Facility: CLINIC | Age: 30
End: 2020-11-18

## 2021-02-08 ENCOUNTER — APPOINTMENT (OUTPATIENT)
Dept: ORTHOPEDIC SURGERY | Facility: CLINIC | Age: 31
End: 2021-02-08
Payer: COMMERCIAL

## 2021-02-08 DIAGNOSIS — M75.81 OTHER SHOULDER LESIONS, RIGHT SHOULDER: ICD-10-CM

## 2021-02-08 PROCEDURE — 99203 OFFICE O/P NEW LOW 30 MIN: CPT

## 2021-02-08 PROCEDURE — 73030 X-RAY EXAM OF SHOULDER: CPT | Mod: RT

## 2021-02-08 PROCEDURE — 99072 ADDL SUPL MATRL&STAF TM PHE: CPT

## 2021-02-09 PROBLEM — M75.81 ROTATOR CUFF TENDINITIS, RIGHT: Status: ACTIVE | Noted: 2021-02-09

## 2021-02-09 NOTE — PHYSICAL EXAM
[de-identified] : Physical Exam:\par General: Well appearing, no acute distress, A&Ox3\par Neurologic: No focal deficits\par Head: NCAT without abrasions, lacerations, or ecchymosis to head, face, or scalp\par Eyes: No scleral icterus, no gross abnormalities\par Respiratory: Equal chest wall expansion bilaterally, no accessory muscle use\par Lymphatic: No lymphadenopathy palpated\par Skin: Warm and dry\par Psychiatric: Normal mood and affect\par \par C-Spine\par Palpation: Tenderness to paraspinal muscles and trapezius muscle\par ROM: side bending, symmetrical. Pain with extension and flexion of the neck.\par Reflexes: C5-7 [normal]\par \par Right Shoulder\par ·	Inspection/Palpation: No tenderness, no crepitus, no deformities\par ·	Range of Motion: no crepitus with ROM; Active ; ER at side 35; IR to L1; Passive , ER at side 45, IR to T7\par ·	Strength: forward elevation in scapular plane [4/5], internal rotation [4/5], external rotation [4/5], adduction [4/5] and abduction [4/5]\par ·	Stability: no joint instability on provocative testing\par ·	Tests: Thomas test positive, Neer positive, bear hug test positive, Napolean sign POS, cross arm adduction positive, lift off sign negative hornblowers sign negative speeds test negative, Yergason's test negative, Sykes's Active Compression test positive whipple test negative bicep's load II test positive\par \par Left Shoulder\par ·	Inspection/Palpation: no tenderness, swelling or deformities\par ·	Range of Motion: full and painless in all planes, no crepitus\par ·	Strength: forward elevation in scapular plane 5/5, internal rotation 5/5, external rotation 5/5, adduction 5/5 and abduction 5/5\par ·	Stability: no joint instability on provocative testing\par Tests: Thomas test negative, Neer sign negative, negative drop arm test secondary to pain, bear hug test negative, Napolean sign negative, cross arm adduction negative, lift off sign positive, hornblowers sign negative, speeds test negative, Yergason's test negative, no bicipital groove tenderness, Sykes's Active Compression test negative\par  [de-identified] : The following radiographs were ordered and read by me during this patients visit. I reviewed each radiograph in detail with the patient and discussed the findings as highlighted below. \par \par 4 views of the right shoulder show no fracture, dislocation or bony lesions. There is no evidence of degenerative change in the glenohumeral and acromioclavicular joints with maintenance of the joint space. Otherwise unremarkable.

## 2021-02-09 NOTE — HISTORY OF PRESENT ILLNESS
[Improving] : improving [___ mths] : [unfilled] month(s) ago [2] : an average pain level of 2/10 [Rest] : relieved by rest [de-identified] : TOO WADE is a 30 year male being seen for R shoulder pain. Patient reports no specific ISIAH. He described pain as sharp/shooting. He feels the most pain when he reaches or pulls. Patient denies numbness and tingling to the extremities. He denies use of OTC pain medications. He has not done any formal PT.\par  [de-identified] : reach/pull

## 2021-02-09 NOTE — DISCUSSION/SUMMARY
[de-identified] : Crispin is a 30-year-old male with right shoulder pain secondary to likely SLAP lesion and biceps tendinitis.  He has seen significant improvement in his function over the past week.  I do thorough discussion with him regarding the pathology and anatomy involving his injury and pain.  We discussed treatment options surgical and nonsurgical.  At this time elects for nonoperative care with physical therapy.  He may take anti-inflammatories as needed.  We also discussed weight lifting exercises and a home excise program to perform as a warm up for both the gym as well as golfing.  I will see him back as needed.  He demonstrates understanding of plan all questions were answered.

## 2021-03-01 ENCOUNTER — APPOINTMENT (OUTPATIENT)
Dept: HEMATOLOGY ONCOLOGY | Facility: CLINIC | Age: 31
End: 2021-03-01

## 2021-03-01 ENCOUNTER — LABORATORY RESULT (OUTPATIENT)
Age: 31
End: 2021-03-01

## 2021-05-26 ENCOUNTER — TRANSCRIPTION ENCOUNTER (OUTPATIENT)
Age: 31
End: 2021-05-26

## 2021-06-24 ENCOUNTER — APPOINTMENT (OUTPATIENT)
Dept: DERMATOLOGY | Facility: CLINIC | Age: 31
End: 2021-06-24
Payer: COMMERCIAL

## 2021-06-24 ENCOUNTER — NON-APPOINTMENT (OUTPATIENT)
Age: 31
End: 2021-06-24

## 2021-06-24 DIAGNOSIS — L30.9 DERMATITIS, UNSPECIFIED: ICD-10-CM

## 2021-06-24 DIAGNOSIS — M25.60 STIFFNESS OF UNSPECIFIED JOINT, NOT ELSEWHERE CLASSIFIED: ICD-10-CM

## 2021-06-24 PROCEDURE — 99072 ADDL SUPL MATRL&STAF TM PHE: CPT

## 2021-06-24 PROCEDURE — 99213 OFFICE O/P EST LOW 20 MIN: CPT

## 2021-06-24 RX ORDER — KETOCONAZOLE 20 MG/G
2 CREAM TOPICAL
Qty: 1 | Refills: 11 | Status: ACTIVE | COMMUNITY
Start: 2021-06-24 | End: 1900-01-01

## 2021-06-24 RX ORDER — KETOCONAZOLE 20.5 MG/ML
2 SHAMPOO, SUSPENSION TOPICAL
Qty: 1 | Refills: 11 | Status: ACTIVE | COMMUNITY
Start: 2021-06-24 | End: 1900-01-01

## 2021-07-06 ENCOUNTER — APPOINTMENT (OUTPATIENT)
Dept: DERMATOLOGY | Facility: CLINIC | Age: 31
End: 2021-07-06

## 2021-08-12 ENCOUNTER — APPOINTMENT (OUTPATIENT)
Dept: HEMATOLOGY ONCOLOGY | Facility: CLINIC | Age: 31
End: 2021-08-12

## 2021-08-12 ENCOUNTER — LABORATORY RESULT (OUTPATIENT)
Age: 31
End: 2021-08-12

## 2021-12-30 ENCOUNTER — APPOINTMENT (OUTPATIENT)
Dept: HEMATOLOGY ONCOLOGY | Facility: CLINIC | Age: 31
End: 2021-12-30

## 2021-12-30 ENCOUNTER — LABORATORY RESULT (OUTPATIENT)
Age: 31
End: 2021-12-30

## 2022-03-21 ENCOUNTER — TRANSCRIPTION ENCOUNTER (OUTPATIENT)
Age: 32
End: 2022-03-21

## 2022-04-15 ENCOUNTER — APPOINTMENT (OUTPATIENT)
Dept: DERMATOLOGY | Facility: CLINIC | Age: 32
End: 2022-04-15
Payer: COMMERCIAL

## 2022-04-15 VITALS — HEIGHT: 68 IN | WEIGHT: 223 LBS | BODY MASS INDEX: 33.8 KG/M2

## 2022-04-15 DIAGNOSIS — D48.5 NEOPLASM OF UNCERTAIN BEHAVIOR OF SKIN: ICD-10-CM

## 2022-04-15 DIAGNOSIS — R21 RASH AND OTHER NONSPECIFIC SKIN ERUPTION: ICD-10-CM

## 2022-04-15 DIAGNOSIS — L28.0 LICHEN SIMPLEX CHRONICUS: ICD-10-CM

## 2022-04-15 PROCEDURE — 99214 OFFICE O/P EST MOD 30 MIN: CPT | Mod: 25

## 2022-04-15 PROCEDURE — 10160 PNXR ASPIR ABSC HMTMA BULLA: CPT | Mod: 59

## 2022-04-15 PROCEDURE — 11900 INJECT SKIN LESIONS </W 7: CPT | Mod: 59

## 2022-04-15 RX ORDER — DOXYCYCLINE HYCLATE 100 MG/1
100 TABLET ORAL
Qty: 28 | Refills: 0 | Status: ACTIVE | COMMUNITY
Start: 2022-04-15 | End: 1900-01-01

## 2022-04-15 NOTE — ASSESSMENT
[FreeTextEntry1] : rash on face\par defers discussion today\par \par inflamed cystic nodule\par NUB\par aspiration I&D; SED\par Risks and benefits were discussed including including atrophy, discoloration\par Intralesional triamcinolone, concentration   10 mg/cc;\par Total volume     0.3 cc\par Sites: 1\par \par doxy 100 mg PO BID x 2 weeks; SED

## 2022-04-15 NOTE — HISTORY OF PRESENT ILLNESS
[FreeTextEntry1] : cyst under left breast [de-identified] : 32 year old male here with cyst under left breast. no tx tried. painful. enlarging.

## 2022-04-15 NOTE — PHYSICAL EXAM
[FreeTextEntry3] : AAOx3, pleasant, NAD, no visual lymphadenopathy\par hair, scalp, face, nose, eyelids, ears, lips, oropharynx, neck, chest, abdomen, back, right arm, left arm, nails, and hands examined with all normal findings,\par pertinent findings include:\par \par mild erythema on face\par inflamed cystic nodule under left breast

## 2022-05-03 NOTE — H&P PST ADULT - RESPIRATORY RATE (BREATHS/MIN)
16

## 2022-06-07 ENCOUNTER — APPOINTMENT (OUTPATIENT)
Dept: DERMATOLOGY | Facility: CLINIC | Age: 32
End: 2022-06-07
Payer: COMMERCIAL

## 2022-06-07 DIAGNOSIS — L72.3 SEBACEOUS CYST: ICD-10-CM

## 2022-06-07 PROCEDURE — 10061 I&D ABSCESS COMP/MULTIPLE: CPT

## 2023-01-22 ENCOUNTER — NON-APPOINTMENT (OUTPATIENT)
Age: 33
End: 2023-01-22

## 2023-02-16 ENCOUNTER — NON-APPOINTMENT (OUTPATIENT)
Age: 33
End: 2023-02-16

## 2024-04-11 ENCOUNTER — EMERGENCY (EMERGENCY)
Facility: HOSPITAL | Age: 34
LOS: 0 days | Discharge: HOME | End: 2024-04-12
Attending: EMERGENCY MEDICINE
Payer: COMMERCIAL

## 2024-04-11 VITALS — WEIGHT: 235.01 LBS | HEIGHT: 68 IN

## 2024-04-11 DIAGNOSIS — R10.31 RIGHT LOWER QUADRANT PAIN: ICD-10-CM

## 2024-04-11 DIAGNOSIS — R63.0 ANOREXIA: ICD-10-CM

## 2024-04-11 DIAGNOSIS — F90.9 ATTENTION-DEFICIT HYPERACTIVITY DISORDER, UNSPECIFIED TYPE: ICD-10-CM

## 2024-04-11 DIAGNOSIS — Z91.018 ALLERGY TO OTHER FOODS: ICD-10-CM

## 2024-04-11 DIAGNOSIS — Z98.890 OTHER SPECIFIED POSTPROCEDURAL STATES: Chronic | ICD-10-CM

## 2024-04-11 DIAGNOSIS — N20.1 CALCULUS OF URETER: ICD-10-CM

## 2024-04-11 PROCEDURE — 85025 COMPLETE CBC W/AUTO DIFF WBC: CPT

## 2024-04-11 PROCEDURE — 80053 COMPREHEN METABOLIC PANEL: CPT

## 2024-04-11 PROCEDURE — 86900 BLOOD TYPING SEROLOGIC ABO: CPT

## 2024-04-11 PROCEDURE — 36415 COLL VENOUS BLD VENIPUNCTURE: CPT

## 2024-04-11 PROCEDURE — 99285 EMERGENCY DEPT VISIT HI MDM: CPT

## 2024-04-11 PROCEDURE — 74177 CT ABD & PELVIS W/CONTRAST: CPT | Mod: MC

## 2024-04-11 PROCEDURE — 99284 EMERGENCY DEPT VISIT MOD MDM: CPT | Mod: 25

## 2024-04-11 PROCEDURE — 96374 THER/PROPH/DIAG INJ IV PUSH: CPT

## 2024-04-11 PROCEDURE — 81001 URINALYSIS AUTO W/SCOPE: CPT

## 2024-04-11 PROCEDURE — 83690 ASSAY OF LIPASE: CPT

## 2024-04-11 PROCEDURE — 86850 RBC ANTIBODY SCREEN: CPT

## 2024-04-11 PROCEDURE — 86901 BLOOD TYPING SEROLOGIC RH(D): CPT

## 2024-04-12 VITALS
RESPIRATION RATE: 18 BRPM | TEMPERATURE: 98 F | SYSTOLIC BLOOD PRESSURE: 132 MMHG | HEART RATE: 73 BPM | DIASTOLIC BLOOD PRESSURE: 77 MMHG | OXYGEN SATURATION: 100 %

## 2024-04-12 DIAGNOSIS — Z90.49 ACQUIRED ABSENCE OF OTHER SPECIFIED PARTS OF DIGESTIVE TRACT: Chronic | ICD-10-CM

## 2024-04-12 LAB
ABO RH CONFIRMATION: SIGNIFICANT CHANGE UP
ALBUMIN SERPL ELPH-MCNC: 4.1 G/DL — SIGNIFICANT CHANGE UP (ref 3.3–5)
ALP SERPL-CCNC: 87 U/L — SIGNIFICANT CHANGE UP (ref 40–120)
ALT FLD-CCNC: 35 U/L — SIGNIFICANT CHANGE UP (ref 12–78)
ANION GAP SERPL CALC-SCNC: 3 MMOL/L — LOW (ref 5–17)
APPEARANCE UR: CLEAR — SIGNIFICANT CHANGE UP
AST SERPL-CCNC: 20 U/L — SIGNIFICANT CHANGE UP (ref 15–37)
BACTERIA # UR AUTO: NEGATIVE /HPF — SIGNIFICANT CHANGE UP
BASOPHILS # BLD AUTO: 0.05 K/UL — SIGNIFICANT CHANGE UP (ref 0–0.2)
BASOPHILS NFR BLD AUTO: 0.4 % — SIGNIFICANT CHANGE UP (ref 0–2)
BILIRUB SERPL-MCNC: 0.6 MG/DL — SIGNIFICANT CHANGE UP (ref 0.2–1.2)
BILIRUB UR-MCNC: NEGATIVE — SIGNIFICANT CHANGE UP
BLD GP AB SCN SERPL QL: SIGNIFICANT CHANGE UP
BUN SERPL-MCNC: 11 MG/DL — SIGNIFICANT CHANGE UP (ref 7–23)
CALCIUM SERPL-MCNC: 9.5 MG/DL — SIGNIFICANT CHANGE UP (ref 8.5–10.1)
CAST: 0 /LPF — SIGNIFICANT CHANGE UP (ref 0–4)
CHLORIDE SERPL-SCNC: 109 MMOL/L — HIGH (ref 96–108)
CO2 SERPL-SCNC: 28 MMOL/L — SIGNIFICANT CHANGE UP (ref 22–31)
COLOR SPEC: YELLOW — SIGNIFICANT CHANGE UP
CREAT SERPL-MCNC: 1.14 MG/DL — SIGNIFICANT CHANGE UP (ref 0.5–1.3)
DIFF PNL FLD: ABNORMAL
EGFR: 85 ML/MIN/1.73M2 — SIGNIFICANT CHANGE UP
EOSINOPHIL # BLD AUTO: 0.09 K/UL — SIGNIFICANT CHANGE UP (ref 0–0.5)
EOSINOPHIL NFR BLD AUTO: 0.7 % — SIGNIFICANT CHANGE UP (ref 0–6)
GLUCOSE SERPL-MCNC: 115 MG/DL — HIGH (ref 70–99)
GLUCOSE UR QL: NEGATIVE MG/DL — SIGNIFICANT CHANGE UP
HCT VFR BLD CALC: 44.1 % — SIGNIFICANT CHANGE UP (ref 39–50)
HGB BLD-MCNC: 15.5 G/DL — SIGNIFICANT CHANGE UP (ref 13–17)
IMM GRANULOCYTES NFR BLD AUTO: 0.5 % — SIGNIFICANT CHANGE UP (ref 0–0.9)
KETONES UR-MCNC: NEGATIVE MG/DL — SIGNIFICANT CHANGE UP
LEUKOCYTE ESTERASE UR-ACNC: ABNORMAL
LIDOCAIN IGE QN: 18 U/L — SIGNIFICANT CHANGE UP (ref 13–75)
LYMPHOCYTES # BLD AUTO: 17.6 % — SIGNIFICANT CHANGE UP (ref 13–44)
LYMPHOCYTES # BLD AUTO: 2.18 K/UL — SIGNIFICANT CHANGE UP (ref 1–3.3)
MCHC RBC-ENTMCNC: 30.1 PG — SIGNIFICANT CHANGE UP (ref 27–34)
MCHC RBC-ENTMCNC: 35.1 GM/DL — SIGNIFICANT CHANGE UP (ref 32–36)
MCV RBC AUTO: 85.6 FL — SIGNIFICANT CHANGE UP (ref 80–100)
MONOCYTES # BLD AUTO: 0.86 K/UL — SIGNIFICANT CHANGE UP (ref 0–0.9)
MONOCYTES NFR BLD AUTO: 7 % — SIGNIFICANT CHANGE UP (ref 2–14)
NEUTROPHILS # BLD AUTO: 9.13 K/UL — HIGH (ref 1.8–7.4)
NEUTROPHILS NFR BLD AUTO: 73.8 % — SIGNIFICANT CHANGE UP (ref 43–77)
NITRITE UR-MCNC: NEGATIVE — SIGNIFICANT CHANGE UP
PH UR: 7.5 — SIGNIFICANT CHANGE UP (ref 5–8)
PLATELET # BLD AUTO: 306 K/UL — SIGNIFICANT CHANGE UP (ref 150–400)
POTASSIUM SERPL-MCNC: 4 MMOL/L — SIGNIFICANT CHANGE UP (ref 3.5–5.3)
POTASSIUM SERPL-SCNC: 4 MMOL/L — SIGNIFICANT CHANGE UP (ref 3.5–5.3)
PROT SERPL-MCNC: 7.6 GM/DL — SIGNIFICANT CHANGE UP (ref 6–8.3)
PROT UR-MCNC: 30 MG/DL
RBC # BLD: 5.15 M/UL — SIGNIFICANT CHANGE UP (ref 4.2–5.8)
RBC # FLD: 11.8 % — SIGNIFICANT CHANGE UP (ref 10.3–14.5)
RBC CASTS # UR COMP ASSIST: 210 /HPF — HIGH (ref 0–4)
SODIUM SERPL-SCNC: 140 MMOL/L — SIGNIFICANT CHANGE UP (ref 135–145)
SP GR SPEC: 1.01 — SIGNIFICANT CHANGE UP (ref 1–1.03)
SQUAMOUS # UR AUTO: 0 /HPF — SIGNIFICANT CHANGE UP (ref 0–5)
UROBILINOGEN FLD QL: 1 MG/DL — SIGNIFICANT CHANGE UP (ref 0.2–1)
WBC # BLD: 12.37 K/UL — HIGH (ref 3.8–10.5)
WBC # FLD AUTO: 12.37 K/UL — HIGH (ref 3.8–10.5)
WBC UR QL: 1 /HPF — SIGNIFICANT CHANGE UP (ref 0–5)

## 2024-04-12 PROCEDURE — 74177 CT ABD & PELVIS W/CONTRAST: CPT | Mod: 26,MC

## 2024-04-12 RX ORDER — TAMSULOSIN HYDROCHLORIDE 0.4 MG/1
1 CAPSULE ORAL
Qty: 7 | Refills: 0
Start: 2024-04-12 | End: 2024-04-18

## 2024-04-12 RX ORDER — KETOROLAC TROMETHAMINE 30 MG/ML
1 SYRINGE (ML) INJECTION
Qty: 15 | Refills: 0
Start: 2024-04-12

## 2024-04-12 RX ORDER — KETOROLAC TROMETHAMINE 30 MG/ML
30 SYRINGE (ML) INJECTION ONCE
Refills: 0 | Status: DISCONTINUED | OUTPATIENT
Start: 2024-04-12 | End: 2024-04-12

## 2024-04-12 RX ORDER — TAMSULOSIN HYDROCHLORIDE 0.4 MG/1
0.4 CAPSULE ORAL ONCE
Refills: 0 | Status: COMPLETED | OUTPATIENT
Start: 2024-04-12 | End: 2024-04-12

## 2024-04-12 RX ORDER — OXYCODONE AND ACETAMINOPHEN 5; 325 MG/1; MG/1
1 TABLET ORAL
Qty: 12 | Refills: 0
Start: 2024-04-12 | End: 2024-04-13

## 2024-04-12 RX ORDER — SODIUM CHLORIDE 9 MG/ML
1000 INJECTION INTRAMUSCULAR; INTRAVENOUS; SUBCUTANEOUS ONCE
Refills: 0 | Status: COMPLETED | OUTPATIENT
Start: 2024-04-12 | End: 2024-04-12

## 2024-04-12 RX ADMIN — Medication 30 MILLIGRAM(S): at 03:16

## 2024-04-12 RX ADMIN — TAMSULOSIN HYDROCHLORIDE 0.4 MILLIGRAM(S): 0.4 CAPSULE ORAL at 03:16

## 2024-04-12 RX ADMIN — SODIUM CHLORIDE 1000 MILLILITER(S): 9 INJECTION INTRAMUSCULAR; INTRAVENOUS; SUBCUTANEOUS at 01:18

## 2024-04-12 NOTE — ED PROVIDER NOTE - NSFOLLOWUPINSTRUCTIONS_ED_ALL_ED_FT
See your urologist within 1-2 days.  Return for fever, worsening pain, inability to urinate.    Kidney Stones    WHAT YOU NEED TO KNOW:    Kidney stones form in the urinary system when the water and waste in your urine are out of balance. When this happens, certain types of waste crystals separate from the urine. The crystals build up and form kidney stones. You may have more than one kidney stone.     DISCHARGE INSTRUCTIONS:    Return to the emergency department if:     You have vomiting that is not relieved by medicine.        Contact your healthcare provider if:     You have a fever.       You have trouble passing urine.      You see blood in your urine.      You have severe pain.      You have any questions or concerns about your condition or care.    Medicines:     NSAIDs, such as ibuprofen, help decrease swelling, pain, and fever. This medicine is available with or without a doctor's order. NSAIDs can cause stomach bleeding or kidney problems in certain people. If you take blood thinner medicine, always ask your healthcare provider if NSAIDs are safe for you. Always read the medicine label and follow directions.      Prescription pain medicine may be given. Ask your healthcare provider how to take this medicine safely. Some prescription pain medicines contain acetaminophen. Do not take other medicines that contain acetaminophen without talking to your healthcare provider. Too much acetaminophen may cause liver damage. Prescription pain medicine may cause constipation. Ask your healthcare provider how to prevent or treat constipation.       Medicines to balance your electrolytes may be needed.       Take your medicine as directed. Contact your healthcare provider if you think your medicine is not helping or if you have side effects. Tell him or her if you are allergic to any medicine. Keep a list of the medicines, vitamins, and herbs you take. Include the amounts, and when and why you take them. Bring the list or the pill bottles to follow-up visits. Carry your medicine list with you in case of an emergency.    Follow up with your healthcare provider as directed: You may need to return for more tests. Write down your questions so you remember to ask them during your visits.    What you can do to manage kidney stones:     Drink more liquids. Your healthcare provider may tell you to drink at least 8 to 12 (eight-ounce) cups of liquids each day. This helps flush out the kidney stones when you urinate. Water is the best liquid to drink.      Strain your urine every time you go to the bathroom. Urinate through a strainer or a piece of thin cloth to catch the stones. Take the stones to your healthcare provider so they can be sent to the lab for tests. This will help your healthcare providers plan the best treatment for you.Look for Stones in the Filter           Eat a variety of healthy foods. Healthy foods include fruits, vegetables, whole-grain breads, low-fat dairy products, beans, and fish. You may need to limit how much sodium (salt) or protein you eat. Ask for information about the best foods for you.      Stay active. Your stones may pass more easily if you stay active. Exercise can also help you manage your weight. Ask about the best activities for you.    After you pass the kidney stones: Your healthcare provider may order a 24-hour urine test. Results from a 24-hour urine test will help your healthcare provider plan ways to prevent more stones from forming. Your healthcare provider will give you more instructions.

## 2024-04-12 NOTE — ED ADULT NURSE NOTE - OBJECTIVE STATEMENT
Pt is a 33 y/o male who presents to the ED with c/o flank pain x2 hours PTA.  Denies fevers, vomiting or urinary symptoms. [facility]

## 2024-04-12 NOTE — ED ADULT NURSE NOTE - CHPI ED NUR SYMPTOMS POS
Quick Palliative Update      Patient called provider today. He states that he has been having return of abdominal discomfort, abdominal cramping, which, appears to move around in various locations of his abdomen and back, and not specifically in just one place. He states that he has been having diarrhea frequently and noting nothing formed. However, he states that he did have a soft formed bowel movement late yesterday afternoon. He admits that it was a good amount. However he continues to have very bad cramps even after his last significant bowel movement. I reiterated the use of suppositories as needed. Including using 1 right now, awaiting for results. And if no results occur, consider using the suppositories Saturday and Sunday as well. We discussed that constipation was the most likely cause of his pain. Tristian Jones asked about getting an x-ray to find out if he is constipated. I suggested treating probable constipation with his bowel regimen that he currently has, awaiting results, and assess if the pain goes away. If the pain goes away, then there is no need for further investigation as constipation is most likely the source of his pain at this time. Patient remains fearful of increased oxycodone use to control pain with the return of worsening constipation. Patient was counseled that he remains on methylnaltrexone to help prevent OIC. We discussed that further removal of unnecessary medications will help to aid in decreasing polypharmacy risk, which can add to constipation if not careful. Patient verbalized understanding and will call this provider back on Monday with an update. Evaluation and Management: including telephonic evaluation, counseling, coordination of care, discussion of goals of care and/or symptoms management for today's encounter was 7 minutes, which, greater than 50% of that time was spent on counseling and coordination of care.      Orin Macedo, MSN, APNP, CHARITY, [address] Support Team   Pager: [phone] PAIN

## 2024-04-12 NOTE — ED PROVIDER NOTE - PATIENT PORTAL LINK FT
You can access the FollowMyHealth Patient Portal offered by [facility] by registering at the following website: [email or web address]. By joining [employer]’s FollowMyHealth portal, you will also be able to view your health information using other applications (apps) compatible with our system.

## 2024-04-12 NOTE — ED PROVIDER NOTE - OBJECTIVE STATEMENT
Pt. is a 35 yo M with PMhx of ADHD, seasonal allergies, cholecystectomy presents with 1 day of abdominal pain.  Patient states he felt like he had gas pains today in mid abdomen , pain has now progressed to right lower abdomen.  On arrival, pain has improved.  No meds taken prior to arrival.  Denies fever, chills, dysuria, back pain or hematuria.  No nausea or vomiting. Decreased appetite for 2 days.
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
## 2024-04-12 NOTE — ED PROVIDER NOTE - CLINICAL SUMMARY MEDICAL DECISION MAKING FREE TEXT BOX
35 yo M with right lower abdominal pain, described as gas pains.  Labs, urine and CT to r/o appendicitis, kidney stone, diverticulitis. 35 yo M with right lower abdominal pain, described as gas pains.  Labs, urine and CT to r/o appendicitis, kidney stone, diverticulitis.    3:00AM; Results of ureteral stone discussed with patient.  Patient feeling better and wants to go home.  Has a urologist at the [facility] [place].  Will f/u, understands pain can return.  Flomax and pain meds sent to pharmacy.

## 2024-04-24 ENCOUNTER — NON-APPOINTMENT (OUTPATIENT)
Age: 34
End: 2024-04-24

## 2024-04-24 ENCOUNTER — OUTPATIENT (OUTPATIENT)
Dept: OUTPATIENT SERVICES | Facility: HOSPITAL | Age: 34
LOS: 1 days | End: 2024-04-24
Payer: COMMERCIAL

## 2024-04-24 ENCOUNTER — APPOINTMENT (OUTPATIENT)
Dept: UROLOGY | Facility: CLINIC | Age: 34
End: 2024-04-24
Payer: COMMERCIAL

## 2024-04-24 ENCOUNTER — APPOINTMENT (OUTPATIENT)
Dept: RADIOLOGY | Facility: CLINIC | Age: 34
End: 2024-04-24
Payer: COMMERCIAL

## 2024-04-24 VITALS
HEIGHT: 68 IN | BODY MASS INDEX: 32.28 KG/M2 | DIASTOLIC BLOOD PRESSURE: 80 MMHG | SYSTOLIC BLOOD PRESSURE: 130 MMHG | WEIGHT: 213 LBS

## 2024-04-24 DIAGNOSIS — Z98.890 OTHER SPECIFIED POSTPROCEDURAL STATES: Chronic | ICD-10-CM

## 2024-04-24 DIAGNOSIS — N20.0 CALCULUS OF KIDNEY: ICD-10-CM

## 2024-04-24 DIAGNOSIS — Z90.49 ACQUIRED ABSENCE OF OTHER SPECIFIED PARTS OF DIGESTIVE TRACT: Chronic | ICD-10-CM

## 2024-04-24 PROCEDURE — 74018 RADEX ABDOMEN 1 VIEW: CPT | Mod: 26

## 2024-04-24 PROCEDURE — 74018 RADEX ABDOMEN 1 VIEW: CPT

## 2024-04-24 PROCEDURE — 99204 OFFICE O/P NEW MOD 45 MIN: CPT

## 2024-04-25 LAB
APPEARANCE: CLEAR
BACTERIA: NEGATIVE /HPF
BILIRUBIN URINE: NEGATIVE
BLOOD URINE: NEGATIVE
CAST: 0 /LPF
COLOR: YELLOW
EPITHELIAL CELLS: 0 /HPF
GLUCOSE QUALITATIVE U: NEGATIVE MG/DL
KETONES URINE: NEGATIVE MG/DL
LEUKOCYTE ESTERASE URINE: NEGATIVE
MICROSCOPIC-UA: NORMAL
NITRITE URINE: NEGATIVE
PH URINE: 5.5
PROTEIN URINE: NEGATIVE MG/DL
RED BLOOD CELLS URINE: 0 /HPF
SPECIFIC GRAVITY URINE: 1.02
UROBILINOGEN URINE: 0.2 MG/DL
WHITE BLOOD CELLS URINE: 2 /HPF

## 2024-04-26 DIAGNOSIS — N20.0 CALCULUS OF KIDNEY: ICD-10-CM

## 2024-04-26 LAB — BACTERIA UR CULT: NORMAL

## 2024-04-26 RX ORDER — TAMSULOSIN HYDROCHLORIDE 0.4 MG/1
0.4 CAPSULE ORAL
Qty: 10 | Refills: 0 | Status: DISCONTINUED | OUTPATIENT
Start: 2024-04-24 | End: 2024-04-26

## 2024-04-26 NOTE — ASSESSMENT
[FreeTextEntry1] : Labs and Imaging reviewed.  Will obtain KUB to assess for sone passage. Will send UA, UC, and Cytology. Recommend increasing fluid intake and adding lemon or citrate to water for stone prevention.  Will repeat RBUS in 1 year.   Patient will RTO in 1-year or sooner if needed.

## 2024-04-26 NOTE — HISTORY OF PRESENT ILLNESS
[FreeTextEntry1] : 34-year-old male presents with complaints of kidney stone. Was seen at  for flank pain were CT showed 6 mm proximal right ureteral stone with mild obstruction. No residual stones. More distal right ureter unremarkable. Left kidney left ureter normal. Patient was discharged on Flomax once pain was controlled. States his flank pain was intermittent then resolved 2 days ago. He denies past history of kidney stones. Reports he is voiding well and is now without  complaints.

## 2024-04-29 LAB — URINE CYTOLOGY: NORMAL

## 2024-05-10 ENCOUNTER — RX CHANGE (OUTPATIENT)
Age: 34
End: 2024-05-10

## 2024-05-10 RX ORDER — TAMSULOSIN HYDROCHLORIDE 0.4 MG/1
0.4 CAPSULE ORAL
Qty: 30 | Refills: 1 | Status: DISCONTINUED | COMMUNITY
Start: 2024-04-26 | End: 2024-05-10

## 2024-05-16 RX ORDER — TAMSULOSIN HYDROCHLORIDE 0.4 MG/1
0.4 CAPSULE ORAL
Qty: 90 | Refills: 2 | Status: ACTIVE | COMMUNITY
Start: 1900-01-01 | End: 1900-01-01

## 2024-05-16 RX ORDER — OXYCODONE AND ACETAMINOPHEN 5; 325 MG/1; MG/1
5-325 TABLET ORAL EVERY 6 HOURS
Qty: 20 | Refills: 0 | Status: ACTIVE | COMMUNITY
Start: 2024-04-26 | End: 1900-01-01

## 2024-07-11 ENCOUNTER — APPOINTMENT (OUTPATIENT)
Dept: UROLOGY | Facility: CLINIC | Age: 34
End: 2024-07-11
Payer: COMMERCIAL

## 2024-07-11 DIAGNOSIS — R31.0 GROSS HEMATURIA: ICD-10-CM

## 2024-07-11 DIAGNOSIS — R10.9 UNSPECIFIED ABDOMINAL PAIN: ICD-10-CM

## 2024-07-11 PROCEDURE — 99214 OFFICE O/P EST MOD 30 MIN: CPT

## 2024-07-12 LAB
APPEARANCE: CLEAR
BACTERIA: NEGATIVE /HPF
BILIRUBIN URINE: NEGATIVE
BLOOD URINE: NEGATIVE
CAST: 0 /LPF
COLOR: YELLOW
EPITHELIAL CELLS: 0 /HPF
GLUCOSE QUALITATIVE U: NEGATIVE MG/DL
KETONES URINE: NEGATIVE MG/DL
LEUKOCYTE ESTERASE URINE: ABNORMAL
MICROSCOPIC-UA: NORMAL
PH URINE: 7
PROTEIN URINE: NEGATIVE MG/DL
RED BLOOD CELLS URINE: 1 /HPF
SPECIFIC GRAVITY URINE: 1.02
UROBILINOGEN URINE: 0.2 MG/DL
WHITE BLOOD CELLS URINE: 3 /HPF

## 2024-07-13 LAB — BACTERIA UR CULT: NORMAL

## 2024-07-16 ENCOUNTER — APPOINTMENT (OUTPATIENT)
Dept: CT IMAGING | Facility: CLINIC | Age: 34
End: 2024-07-16
Payer: COMMERCIAL

## 2024-07-16 ENCOUNTER — OUTPATIENT (OUTPATIENT)
Dept: OUTPATIENT SERVICES | Facility: HOSPITAL | Age: 34
LOS: 1 days | End: 2024-07-16
Payer: COMMERCIAL

## 2024-07-16 DIAGNOSIS — R31.0 GROSS HEMATURIA: ICD-10-CM

## 2024-07-16 DIAGNOSIS — Z00.8 ENCOUNTER FOR OTHER GENERAL EXAMINATION: ICD-10-CM

## 2024-07-16 DIAGNOSIS — Z90.49 ACQUIRED ABSENCE OF OTHER SPECIFIED PARTS OF DIGESTIVE TRACT: Chronic | ICD-10-CM

## 2024-07-16 DIAGNOSIS — Z98.890 OTHER SPECIFIED POSTPROCEDURAL STATES: Chronic | ICD-10-CM

## 2024-07-16 LAB — URINE CYTOLOGY: NORMAL

## 2024-07-16 PROCEDURE — 74178 CT ABD&PLV WO CNTR FLWD CNTR: CPT | Mod: 26

## 2024-07-16 PROCEDURE — 74178 CT ABD&PLV WO CNTR FLWD CNTR: CPT

## 2024-07-18 ENCOUNTER — NON-APPOINTMENT (OUTPATIENT)
Age: 34
End: 2024-07-18

## 2024-07-30 ENCOUNTER — APPOINTMENT (OUTPATIENT)
Dept: UROLOGY | Facility: CLINIC | Age: 34
End: 2024-07-30

## 2024-08-20 ENCOUNTER — NON-APPOINTMENT (OUTPATIENT)
Age: 34
End: 2024-08-20

## 2024-08-21 ENCOUNTER — INPATIENT (INPATIENT)
Facility: HOSPITAL | Age: 34
LOS: 1 days | Discharge: HOME | DRG: 694 | End: 2024-08-23
Attending: SPECIALIST | Admitting: STUDENT IN AN ORGANIZED HEALTH CARE EDUCATION/TRAINING PROGRAM
Payer: COMMERCIAL

## 2024-08-21 VITALS — HEIGHT: 68 IN

## 2024-08-21 DIAGNOSIS — Z90.49 ACQUIRED ABSENCE OF OTHER SPECIFIED PARTS OF DIGESTIVE TRACT: Chronic | ICD-10-CM

## 2024-08-21 DIAGNOSIS — N20.0 CALCULUS OF KIDNEY: ICD-10-CM

## 2024-08-21 DIAGNOSIS — Z98.890 OTHER SPECIFIED POSTPROCEDURAL STATES: Chronic | ICD-10-CM

## 2024-08-21 LAB
ALBUMIN SERPL ELPH-MCNC: 3.9 G/DL — SIGNIFICANT CHANGE UP (ref 3.3–5)
ALP SERPL-CCNC: 80 U/L — SIGNIFICANT CHANGE UP (ref 40–120)
ALT FLD-CCNC: 33 U/L — SIGNIFICANT CHANGE UP (ref 12–78)
ANION GAP SERPL CALC-SCNC: 4 MMOL/L — LOW (ref 5–17)
APPEARANCE UR: CLEAR — SIGNIFICANT CHANGE UP
AST SERPL-CCNC: 22 U/L — SIGNIFICANT CHANGE UP (ref 15–37)
BACTERIA # UR AUTO: NEGATIVE /HPF — SIGNIFICANT CHANGE UP
BASOPHILS # BLD AUTO: 0.06 K/UL — SIGNIFICANT CHANGE UP (ref 0–0.2)
BASOPHILS NFR BLD AUTO: 0.4 % — SIGNIFICANT CHANGE UP (ref 0–2)
BILIRUB SERPL-MCNC: 0.8 MG/DL — SIGNIFICANT CHANGE UP (ref 0.2–1.2)
BILIRUB UR-MCNC: NEGATIVE — SIGNIFICANT CHANGE UP
BLD GP AB SCN SERPL QL: SIGNIFICANT CHANGE UP
BUN SERPL-MCNC: 12 MG/DL — SIGNIFICANT CHANGE UP (ref 7–23)
CALCIUM SERPL-MCNC: 9.4 MG/DL — SIGNIFICANT CHANGE UP (ref 8.5–10.1)
CAST: 0 /LPF — SIGNIFICANT CHANGE UP (ref 0–4)
CHLORIDE SERPL-SCNC: 106 MMOL/L — SIGNIFICANT CHANGE UP (ref 96–108)
CO2 SERPL-SCNC: 28 MMOL/L — SIGNIFICANT CHANGE UP (ref 22–31)
COLOR SPEC: YELLOW — SIGNIFICANT CHANGE UP
CREAT SERPL-MCNC: 1.23 MG/DL — SIGNIFICANT CHANGE UP (ref 0.5–1.3)
DIFF PNL FLD: ABNORMAL
EGFR: 78 ML/MIN/1.73M2 — SIGNIFICANT CHANGE UP
EOSINOPHIL # BLD AUTO: 0.09 K/UL — SIGNIFICANT CHANGE UP (ref 0–0.5)
EOSINOPHIL NFR BLD AUTO: 0.6 % — SIGNIFICANT CHANGE UP (ref 0–6)
GLUCOSE SERPL-MCNC: 91 MG/DL — SIGNIFICANT CHANGE UP (ref 70–99)
GLUCOSE UR QL: NEGATIVE MG/DL — SIGNIFICANT CHANGE UP
HCT VFR BLD CALC: 43.3 % — SIGNIFICANT CHANGE UP (ref 39–50)
HGB BLD-MCNC: 15.3 G/DL — SIGNIFICANT CHANGE UP (ref 13–17)
IMM GRANULOCYTES NFR BLD AUTO: 0.4 % — SIGNIFICANT CHANGE UP (ref 0–0.9)
INR BLD: 0.95 RATIO — SIGNIFICANT CHANGE UP (ref 0.85–1.18)
KETONES UR-MCNC: NEGATIVE MG/DL — SIGNIFICANT CHANGE UP
LEUKOCYTE ESTERASE UR-ACNC: NEGATIVE — SIGNIFICANT CHANGE UP
LIDOCAIN IGE QN: 43 U/L — SIGNIFICANT CHANGE UP (ref 13–75)
LYMPHOCYTES # BLD AUTO: 1.96 K/UL — SIGNIFICANT CHANGE UP (ref 1–3.3)
LYMPHOCYTES # BLD AUTO: 13.7 % — SIGNIFICANT CHANGE UP (ref 13–44)
MCHC RBC-ENTMCNC: 30.4 PG — SIGNIFICANT CHANGE UP (ref 27–34)
MCHC RBC-ENTMCNC: 35.3 GM/DL — SIGNIFICANT CHANGE UP (ref 32–36)
MCV RBC AUTO: 86.1 FL — SIGNIFICANT CHANGE UP (ref 80–100)
MONOCYTES # BLD AUTO: 1.06 K/UL — HIGH (ref 0–0.9)
MONOCYTES NFR BLD AUTO: 7.4 % — SIGNIFICANT CHANGE UP (ref 2–14)
NEUTROPHILS # BLD AUTO: 11.05 K/UL — HIGH (ref 1.8–7.4)
NEUTROPHILS NFR BLD AUTO: 77.5 % — HIGH (ref 43–77)
NITRITE UR-MCNC: NEGATIVE — SIGNIFICANT CHANGE UP
PH UR: 7 — SIGNIFICANT CHANGE UP (ref 5–8)
PLATELET # BLD AUTO: 290 K/UL — SIGNIFICANT CHANGE UP (ref 150–400)
POTASSIUM SERPL-MCNC: 4.2 MMOL/L — SIGNIFICANT CHANGE UP (ref 3.5–5.3)
POTASSIUM SERPL-SCNC: 4.2 MMOL/L — SIGNIFICANT CHANGE UP (ref 3.5–5.3)
PROT SERPL-MCNC: 7.3 GM/DL — SIGNIFICANT CHANGE UP (ref 6–8.3)
PROT UR-MCNC: NEGATIVE MG/DL — SIGNIFICANT CHANGE UP
PROTHROM AB SERPL-ACNC: 10.8 SEC — SIGNIFICANT CHANGE UP (ref 9.5–13)
RBC # BLD: 5.03 M/UL — SIGNIFICANT CHANGE UP (ref 4.2–5.8)
RBC # FLD: 11.8 % — SIGNIFICANT CHANGE UP (ref 10.3–14.5)
RBC CASTS # UR COMP ASSIST: 18 /HPF — HIGH (ref 0–4)
SODIUM SERPL-SCNC: 138 MMOL/L — SIGNIFICANT CHANGE UP (ref 135–145)
SP GR SPEC: 1.01 — SIGNIFICANT CHANGE UP (ref 1–1.03)
SQUAMOUS # UR AUTO: 0 /HPF — SIGNIFICANT CHANGE UP (ref 0–5)
UROBILINOGEN FLD QL: 0.2 MG/DL — SIGNIFICANT CHANGE UP (ref 0.2–1)
WBC # BLD: 14.28 K/UL — HIGH (ref 3.8–10.5)
WBC # FLD AUTO: 14.28 K/UL — HIGH (ref 3.8–10.5)
WBC UR QL: 1 /HPF — SIGNIFICANT CHANGE UP (ref 0–5)

## 2024-08-21 PROCEDURE — 76000 FLUOROSCOPY <1 HR PHYS/QHP: CPT

## 2024-08-21 PROCEDURE — 74176 CT ABD & PELVIS W/O CONTRAST: CPT | Mod: 26,MC

## 2024-08-21 PROCEDURE — 99285 EMERGENCY DEPT VISIT HI MDM: CPT

## 2024-08-21 PROCEDURE — 85027 COMPLETE CBC AUTOMATED: CPT

## 2024-08-21 PROCEDURE — 36415 COLL VENOUS BLD VENIPUNCTURE: CPT

## 2024-08-21 PROCEDURE — C1769: CPT

## 2024-08-21 PROCEDURE — C2625: CPT

## 2024-08-21 PROCEDURE — 80048 BASIC METABOLIC PNL TOTAL CA: CPT

## 2024-08-21 RX ORDER — ONDANSETRON 2 MG/ML
4 INJECTION, SOLUTION INTRAMUSCULAR; INTRAVENOUS EVERY 6 HOURS
Refills: 0 | Status: DISCONTINUED | OUTPATIENT
Start: 2024-08-21 | End: 2024-08-23

## 2024-08-21 RX ORDER — SODIUM CHLORIDE 9 MG/ML
1000 INJECTION INTRAMUSCULAR; INTRAVENOUS; SUBCUTANEOUS
Refills: 0 | Status: DISCONTINUED | OUTPATIENT
Start: 2024-08-21 | End: 2024-08-23

## 2024-08-21 RX ORDER — TAMSULOSIN HYDROCHLORIDE 0.4 MG/1
0.4 CAPSULE ORAL AT BEDTIME
Refills: 0 | Status: DISCONTINUED | OUTPATIENT
Start: 2024-08-21 | End: 2024-08-22

## 2024-08-21 RX ORDER — ACETAMINOPHEN 325 MG/1
650 TABLET ORAL EVERY 6 HOURS
Refills: 0 | Status: DISCONTINUED | OUTPATIENT
Start: 2024-08-21 | End: 2024-08-23

## 2024-08-21 RX ORDER — ONDANSETRON 2 MG/ML
4 INJECTION, SOLUTION INTRAMUSCULAR; INTRAVENOUS ONCE
Refills: 0 | Status: COMPLETED | OUTPATIENT
Start: 2024-08-21 | End: 2024-08-21

## 2024-08-21 RX ORDER — SODIUM CHLORIDE 9 MG/ML
1000 INJECTION INTRAMUSCULAR; INTRAVENOUS; SUBCUTANEOUS ONCE
Refills: 0 | Status: COMPLETED | OUTPATIENT
Start: 2024-08-21 | End: 2024-08-21

## 2024-08-21 RX ORDER — DEXTROAMPHETAMINE SACCHARATE, AMPHETAMINE ASPARTATE MONOHYDRATE, DEXTROAMPHETAMINE SULFATE, AMPHETAMINE SULFATE 6.25; 6.25; 6.25; 6.25 MG/1; MG/1; MG/1; MG/1
1 CAPSULE, EXTENDED RELEASE ORAL
Refills: 0 | DISCHARGE

## 2024-08-21 RX ORDER — HYDROMORPHONE HYDROCHLORIDE 2 MG/1
1 TABLET ORAL EVERY 6 HOURS
Refills: 0 | Status: DISCONTINUED | OUTPATIENT
Start: 2024-08-21 | End: 2024-08-23

## 2024-08-21 RX ORDER — IBUPROFEN 600 MG
2 TABLET ORAL
Refills: 0 | DISCHARGE

## 2024-08-21 RX ADMIN — SODIUM CHLORIDE 1000 MILLILITER(S): 9 INJECTION INTRAMUSCULAR; INTRAVENOUS; SUBCUTANEOUS at 15:56

## 2024-08-21 RX ADMIN — ONDANSETRON 4 MILLIGRAM(S): 2 INJECTION, SOLUTION INTRAMUSCULAR; INTRAVENOUS at 15:56

## 2024-08-21 RX ADMIN — SODIUM CHLORIDE 125 MILLILITER(S): 9 INJECTION INTRAMUSCULAR; INTRAVENOUS; SUBCUTANEOUS at 21:48

## 2024-08-21 RX ADMIN — Medication 4 MILLIGRAM(S): at 19:29

## 2024-08-21 RX ADMIN — TAMSULOSIN HYDROCHLORIDE 0.4 MILLIGRAM(S): 0.4 CAPSULE ORAL at 21:47

## 2024-08-21 RX ADMIN — Medication 4 MILLIGRAM(S): at 15:56

## 2024-08-21 RX ADMIN — ONDANSETRON 4 MILLIGRAM(S): 2 INJECTION, SOLUTION INTRAMUSCULAR; INTRAVENOUS at 19:30

## 2024-08-21 NOTE — H&P ADULT - NSHPLABSRESULTS_GEN_ALL_CORE
LABS:                          15.3   14.28 )-----------( 290      ( 21 Aug 2024 15:50 )             43.3         138  |  106  |  12  ----------------------------<  91  4.2   |  28  |  1.23    Ca    9.4      21 Aug 2024 15:50    TPro  7.3  /  Alb  3.9  /  TBili  0.8  /  DBili  x   /  AST  22  /  ALT  33  /  AlkPhos  80      LIVER FUNCTIONS - ( 21 Aug 2024 15:50 )  Alb: 3.9 g/dL / Pro: 7.3 gm/dL / ALK PHOS: 80 U/L / ALT: 33 U/L / AST: 22 U/L / GGT: x           PT/INR - ( 21 Aug 2024 15:50 )   PT: 10.8 sec;   INR: 0.95 ratio           Urinalysis Basic - ( 21 Aug 2024 15:50 )    Color: Yellow / Appearance: Clear / S.011 / pH: x  Gluc: 91 mg/dL / Ketone: Negative mg/dL  / Bili: Negative / Urobili: 0.2 mg/dL   Blood: x / Protein: Negative mg/dL / Nitrite: Negative   Leuk Esterase: Negative / RBC: 18 /HPF / WBC 1 /HPF   Sq Epi: x / Non Sq Epi: 0 /HPF / Bacteria: Negative /HPF    ACC: [id number] EXAM:  CT ABDOMEN AND PELVIS   ORDERED BY: KIA COSBY     PROCEDURE DATE:  2024          INTERPRETATION:  CLINICAL INFORMATION: Right flank pain    COMPARISON: CT abdomen 2024.    CONTRAST/COMPLICATIONS:  IV Contrast: NONE  Oral Contrast: NONE  Complications: None reported at time of study completion    PROCEDURE:  CT of the Abdomen and Pelvis was performed.  Sagittal and coronal reformats were performed.    FINDINGS:  LOWER CHEST: Lung bases within normal limits.    LIVER: Within normal limits.  BILE DUCTS: Normal caliber.  GALLBLADDER: Cholecystectomy.  SPLEEN: Within normal limits.  PANCREAS: Within normal limits.  ADRENALS: Within normal limits.  KIDNEYS/URETERS: Mild to moderate right hydronephrosis and hydroureter   down to the right distal ureter where there is an obstructive 7mm   calculus, just proximal to the right UV junction. No left-sided   nephroureterolithiasis. Asymmetric right perinephric stranding.    BLADDER: Within normal limits. No bladdercalculi.  REPRODUCTIVE ORGANS: Prostate within normal limits.    BOWEL: No bowel obstruction. Appendix is normal. Scattered colonic   diverticula, no acute diverticulitis.  PERITONEUM/RETROPERITONEUM: Within normal limits.  VESSELS: Within normal limits.  LYMPH NODES: No lymphadenopathy.  ABDOMINAL WALL: Very small fat-containing umbilical hernia.  BONES: Degenerative changes of the spine. Schmorl's node in T11.    IMPRESSION:  Obstructive 7 mm right distal ureteral calculus just before the UV   junction. Mild to moderate right hydroureteronephrosis.

## 2024-08-21 NOTE — ED ADULT NURSE NOTE - NSFALLRISKFACTORS_ED_ALL_ED
S:  Patient presents for multiple issues.  First is annual maintenance.  She would like a mammogram, and lab work.  She also notes that she has feelings of occasional irregular heartbeat.  It occurs only when she is resting, and not when she is active.  She runs multiple miles each day for exercise.  She is also recently quit smoking.  She is having withdrawal symptoms from smoking cessation.  She would like medication for this.  ROS negative for fever.  Negative for chest pain or shortness of breath.  Health maintenance issues reviewed showing she is due for tetanus and we will give her flu vaccine today.  In addition she declines Pap smear.  She is also overdue for mammogram.  In addition she notes that both of her upper eyelids are protruding onto her field of vision and she would like this addressed.    O:   Blood pressure 110/80, pulse 70, respirations 16  Alert conversant no acute distress  ENT-normal except for mild ptosis bilaterally  Lungs clear to auscultation  Heart rate rate and rhythm  Skin Pink and dry    EKG-sinus rhythm no ST or T wave changes    A:   Anxiety  Smoking  Hypertension  Ptosis      P:   Referral to plastic surgery for ptosis  Most likely she has anxiety relating to her occasional feelings of anxiousness with skipped beats.  No evidence of chest pain or shortness of breath.  She is easily running multiple miles without difficulty  Nicorette lozenges prescribed  Encourage smoke cessation  Her blood pressure is excellent today and she has stopped smoking so we will discontinue hypertension medication and monitor blood pressure  CBC CMP lipids today  Mammogram ordered  RTC 1 year     No indicators present

## 2024-08-21 NOTE — H&P ADULT - ASSESSMENT
A/P:  33 y/o male with Right kidney stone    Admit to   IV hydration  Pain Med PRN  Flomax 0.4 mg po qhs  NPO after midnight  Going for Right Ureteral Stent placement tomorrow

## 2024-08-21 NOTE — PATIENT PROFILE ADULT - FALL HARM RISK - UNIVERSAL INTERVENTIONS
Bed in lowest position, wheels locked, appropriate side rails in place/Call bell, personal items and telephone in reach/Instruct patient to call for assistance before getting out of bed or chair/Non-slip footwear when patient is out of bed/[place] to call system/Physically safe environment - no spills, clutter or unnecessary equipment/Purposeful Proactive Rounding/Room/bathroom lighting operational, light cord in reach

## 2024-08-21 NOTE — H&P ADULT - HISTORY OF PRESENT ILLNESS
· HPI Objective Statement: 34-year-old male with a past medical history of kidney stones presents with right flank pain that started this morning.  Patient states that he has had a prior kidney stone back in April and was sent home on pain meds.  Symptoms have resolved and returned about a month ago.  Followed up again with Dr. Banks from urology who did another CT showed that he still had the same stone.  However, since his symptoms have resolved patient has been feeling better until his symptoms returned this morning.  Patient spoke with Dr. Banks who advised him to come to the emergency room for admission and possible stent placement.  Associated nausea.  Denies fever, chills, vomiting, diarrhea, dysuria, hematuria, frequency of urination.

## 2024-08-21 NOTE — H&P ADULT - NSHPPHYSICALEXAM_GEN_ALL_CORE
PHYSICAL EXAM:  Vital Signs Last 24 Hrs  T(C): 36.8 (21 Aug 2024 15:08), Max: 36.8 (21 Aug 2024 15:08)  T(F): 98.2 (21 Aug 2024 15:08), Max: 98.2 (21 Aug 2024 15:08)  HR: 85 (21 Aug 2024 15:08) (85 - 85)  BP: 137/90 (21 Aug 2024 15:08) (137/90 - 137/90)  BP(mean): 103 (21 Aug 2024 15:08) (103 - 103)  RR: 18 (21 Aug 2024 15:08) (18 - 18)  SpO2: 100% (21 Aug 2024 15:08) (100% - 100%)    Parameters below as of 21 Aug 2024 15:08  Patient On (Oxygen Delivery Method): room air      CONSTITUTIONAL: Well appearing, awake, alert, oriented to person, place, time/situation and in no apparent distress.  Head:  NC/AT  Neck: supple, symmetric  Cardiac: RRR no MRG  Resp: clear, no wheezing or crackles  GI: ab soft ntnd, no r/g  Back:  Mild Right CVA tenderness  Ext: no calf tenderness  Neuro: A&Ox3, CABRERA  Skin: No rashes  ·

## 2024-08-21 NOTE — ED ADULT NURSE NOTE - CAS EDP DISCH DISPOSITION ADMI
Detail Level: Detailed Quality 431: Preventive Care And Screening: Unhealthy Alcohol Use - Screening: Documentation of medical reason(s) for not screening for unhealthy alcohol use (eg, limited life expectancy, other medical reasons) Quality 358: Patient-Centered Surgical Risk Assessment And Communication: Documentation of patient-specific risk assessment with a risk calculator based on multi-institutional clinical data, the specific risk calculator used, and communication of risk assessment from risk calculator with the patient or family. Quality 130: Documentation Of Current Medications In The Medical Record: Current Medications Documented Quality 226: Preventive Care And Screening: Tobacco Use: Screening And Cessation Intervention: Patient screened for tobacco use and is an ex/non-smoker 2N/MedSurg

## 2024-08-21 NOTE — ED PROVIDER NOTE - PHYSICAL EXAMINATION
Constitutional: NAD, alert, verbal  HEENT: NCAT, EOMi, PERRL  Cardiac: RRR no MRG  Resp: clear, no wheezing or crackles  GI: ab soft ntnd, no r/g  Ext: no edema  Neuro: A&Ox3, CABRERA  Skin: No rashes

## 2024-08-21 NOTE — ED ADULT NURSE NOTE - OBJECTIVE STATEMENT
pt presents to ER to L sided pain. pt reports was dx with kidney stones last time he came to . states "the pain is almost as bad as the kidney stone". hx gallstone removal. denies any other medical complaints.

## 2024-08-21 NOTE — ED ADULT TRIAGE NOTE - CHIEF COMPLAINT QUOTE
Pt sent to ED for obstructing stone to the R ureter on outpatient imaging, c/o severe R sided flank pain radiating to the bladder. Endorses minimal urinary output. No pain meds taken PTA. Denies dysuria/hematuria, N/V, and fever.

## 2024-08-21 NOTE — PHARMACOTHERAPY INTERVENTION NOTE - COMMENTS
Medication reconciliation completed.  Reviewed Medication list and confirmed med allergies with patient; confirmed with Dr. Bertrand MedHx.  pt confirms that when last dc's for stone he was prescribed percocet & ketorolac for pain, he states that the percocet helped best but made him nauseous.

## 2024-08-21 NOTE — ED PROVIDER NOTE - CLINICAL SUMMARY MEDICAL DECISION MAKING FREE TEXT BOX
34 year old male with history of kidney stones, follows with dr Vazquez, sent in for right flank pain, intermittent for 2 weeks but worsened today with difficulty urinating.   denies fever and urinary symptoms. + nausea, taking percocet and tamsulosin at home for pain. no other medical complaints at this time. afebrile. Vitals within normal range,  abdomen soft , non distended. no CVA tenderness. well-appearing on exam.   plan: rule out kidney stones, obtain chest abdominal and pelvis, labs urology reassess. 34 year old male with history of kidney stones, follows with Dr. Banks, sent in for right flank pain, intermittent for 2 weeks but worsened today with difficulty urinating. denies fever and urinary symptoms. + nausea, taking percocet and tamsulosin at home for pain. no other medical complaints at this time. afebrile. Vitals within normal range,  abdomen soft , non distended. no CVA tenderness. well-appearing on exam.   plan: rule out kidney stones, obtain chest abdominal and pelvis, labs urology reassess.

## 2024-08-21 NOTE — ED PROVIDER NOTE - PROGRESS NOTE DETAILS
Discussed case with urology PA director.  Recommends repeating the CT and likely admission for stent placement with Dr. Banks. -Jimmy Felipe PA-C Spoke with urology VÍCTOR Junior who spoke with Dr. Interiano.  Patient to be admitted under Dr. Interiano and most likely stent placement tomorrow.  Urology PA spoke with patient who is agreeable and agrees to admission as well. -Jimmy Felipe PA-C

## 2024-08-21 NOTE — ED PROVIDER NOTE - OBJECTIVE STATEMENT
34-year-old male with a past medical history of kidney stones presents with right flank pain that started this morning.  Patient states that he has had a prior kidney stone back in April and was sent home on pain meds.  Symptoms have resolved and returned about a month ago.  Followed up again with Dr. Banks from urology who did another CT showed that he still had the same stone.  However, since his symptoms have resolved patient has been feeling better until his symptoms returned this morning.  Patient spoke with Dr. Banks who advised him to come to the emergency room for admission and possible stent placement.  Associated nausea.  Denies fever, chills, vomiting, diarrhea, dysuria, hematuria, frequency of urination.

## 2024-08-22 LAB
ANION GAP SERPL CALC-SCNC: 4 MMOL/L — LOW (ref 5–17)
BUN SERPL-MCNC: 10 MG/DL — SIGNIFICANT CHANGE UP (ref 7–23)
CALCIUM SERPL-MCNC: 8.6 MG/DL — SIGNIFICANT CHANGE UP (ref 8.5–10.1)
CHLORIDE SERPL-SCNC: 112 MMOL/L — HIGH (ref 96–108)
CO2 SERPL-SCNC: 29 MMOL/L — SIGNIFICANT CHANGE UP (ref 22–31)
CREAT SERPL-MCNC: 1.03 MG/DL — SIGNIFICANT CHANGE UP (ref 0.5–1.3)
EGFR: 97 ML/MIN/1.73M2 — SIGNIFICANT CHANGE UP
GLUCOSE SERPL-MCNC: 96 MG/DL — SIGNIFICANT CHANGE UP (ref 70–99)
HCT VFR BLD CALC: 41.7 % — SIGNIFICANT CHANGE UP (ref 39–50)
HGB BLD-MCNC: 14.1 G/DL — SIGNIFICANT CHANGE UP (ref 13–17)
MCHC RBC-ENTMCNC: 29.8 PG — SIGNIFICANT CHANGE UP (ref 27–34)
MCHC RBC-ENTMCNC: 33.8 GM/DL — SIGNIFICANT CHANGE UP (ref 32–36)
MCV RBC AUTO: 88.2 FL — SIGNIFICANT CHANGE UP (ref 80–100)
PLATELET # BLD AUTO: 249 K/UL — SIGNIFICANT CHANGE UP (ref 150–400)
POTASSIUM SERPL-MCNC: 4.9 MMOL/L — SIGNIFICANT CHANGE UP (ref 3.5–5.3)
POTASSIUM SERPL-SCNC: 4.9 MMOL/L — SIGNIFICANT CHANGE UP (ref 3.5–5.3)
RBC # BLD: 4.73 M/UL — SIGNIFICANT CHANGE UP (ref 4.2–5.8)
RBC # FLD: 12 % — SIGNIFICANT CHANGE UP (ref 10.3–14.5)
SODIUM SERPL-SCNC: 145 MMOL/L — SIGNIFICANT CHANGE UP (ref 135–145)
WBC # BLD: 6.69 K/UL — SIGNIFICANT CHANGE UP (ref 3.8–10.5)
WBC # FLD AUTO: 6.69 K/UL — SIGNIFICANT CHANGE UP (ref 3.8–10.5)

## 2024-08-22 RX ORDER — PHENAZOPYRIDINE HCL 100 MG
200 TABLET ORAL THREE TIMES A DAY
Refills: 0 | Status: DISCONTINUED | OUTPATIENT
Start: 2024-08-22 | End: 2024-08-23

## 2024-08-22 RX ORDER — HYDROMORPHONE HYDROCHLORIDE 2 MG/1
0.5 TABLET ORAL
Refills: 0 | Status: DISCONTINUED | OUTPATIENT
Start: 2024-08-22 | End: 2024-08-22

## 2024-08-22 RX ORDER — OXYBUTYNIN CHLORIDE 5 MG/1
5 TABLET ORAL DAILY
Refills: 0 | Status: DISCONTINUED | OUTPATIENT
Start: 2024-08-22 | End: 2024-08-23

## 2024-08-22 RX ORDER — IBUPROFEN 600 MG
200 TABLET ORAL THREE TIMES A DAY
Refills: 0 | Status: DISCONTINUED | OUTPATIENT
Start: 2024-08-22 | End: 2024-08-23

## 2024-08-22 RX ORDER — ACETAMINOPHEN 325 MG/1
2 TABLET ORAL
Refills: 0 | DISCHARGE

## 2024-08-22 RX ORDER — SULFAMETHOXAZOLE AND TRIMETHOPRIM 800; 160 MG/1; MG/1
1 TABLET ORAL
Refills: 0 | Status: DISCONTINUED | OUTPATIENT
Start: 2024-08-22 | End: 2024-08-23

## 2024-08-22 RX ADMIN — SULFAMETHOXAZOLE AND TRIMETHOPRIM 1 TABLET(S): 800; 160 TABLET ORAL at 21:17

## 2024-08-22 RX ADMIN — SODIUM CHLORIDE 125 MILLILITER(S): 9 INJECTION INTRAMUSCULAR; INTRAVENOUS; SUBCUTANEOUS at 04:32

## 2024-08-22 RX ADMIN — HYDROMORPHONE HYDROCHLORIDE 0.5 MILLIGRAM(S): 2 TABLET ORAL at 18:22

## 2024-08-22 RX ADMIN — OXYBUTYNIN CHLORIDE 5 MILLIGRAM(S): 5 TABLET ORAL at 18:49

## 2024-08-22 RX ADMIN — Medication 200 MILLIGRAM(S): at 18:49

## 2024-08-22 NOTE — BRIEF OPERATIVE NOTE - NSICDXBRIEFPROCEDURE_GEN_ALL_CORE_FT
PROCEDURES:  Cystoscopy, with retrograde pyelogram and ureteral stent insertion 22-Aug-2024 18:11:58  Roman Banks

## 2024-08-23 ENCOUNTER — TRANSCRIPTION ENCOUNTER (OUTPATIENT)
Age: 34
End: 2024-08-23

## 2024-08-23 VITALS
RESPIRATION RATE: 18 BRPM | HEART RATE: 75 BPM | TEMPERATURE: 98 F | SYSTOLIC BLOOD PRESSURE: 113 MMHG | DIASTOLIC BLOOD PRESSURE: 84 MMHG | OXYGEN SATURATION: 98 %

## 2024-08-23 PROCEDURE — 99238 HOSP IP/OBS DSCHRG MGMT 30/<: CPT

## 2024-08-23 RX ORDER — PHENAZOPYRIDINE HCL 100 MG
1 TABLET ORAL
Qty: 9 | Refills: 0
Start: 2024-08-23 | End: 2024-08-25

## 2024-08-23 RX ORDER — OXYCODONE HYDROCHLORIDE 5 MG/1
1 TABLET ORAL
Qty: 16 | Refills: 0
Start: 2024-08-23 | End: 2024-08-26

## 2024-08-23 RX ADMIN — SULFAMETHOXAZOLE AND TRIMETHOPRIM 1 TABLET(S): 800; 160 TABLET ORAL at 09:21

## 2024-08-23 NOTE — PROGRESS NOTE ADULT - SUBJECTIVE AND OBJECTIVE BOX
POD # 1    Pt seen and examined without complaints. Pt tolerating reg diet and voiding.     Vital Signs Last 24 Hrs  T(C): 36.7 (23 Aug 2024 04:07), Max: 36.8 (23 Aug 2024 00:18)  T(F): 98.1 (23 Aug 2024 04:07), Max: 98.2 (23 Aug 2024 00:18)  HR: 72 (23 Aug 2024 04:07) (55 - 75)  BP: 120/77 (23 Aug 2024 04:07) (103/66 - 130/91)  BP(mean): 102 (22 Aug 2024 19:25) (102 - 102)  RR: 18 (23 Aug 2024 04:07) (11 - 18)  SpO2: 96% (23 Aug 2024 04:07) (93% - 100%)    Parameters below as of 23 Aug 2024 04:07  Patient On (Oxygen Delivery Method): room air        I&O's Summary    22 Aug 2024 07:01  -  23 Aug 2024 07:00  --------------------------------------------------------  IN: 3100 mL / OUT: 4250 mL / NET: -1150 mL        Physical Exam  Gen: NAD, A&Ox3  Pulm: CTA bilat, no rales, rhonchi or wheezes.  No respiratory distress, no subcostal retractions  CV: RRR, S1S2 present, no JVD  Abd: Soft, NT, ND, +BS, no bladder palp  Back: No CVA tenderness  : Pt voiding - 500 cc pyridium colored urine      LABS:                          14.1   6.69  )-----------( 249      ( 22 Aug 2024 09:28 )             41.7     08-22    145  |  112<H>  |  10  ----------------------------<  96  4.9   |  29  |  1.03    Ca    8.6      22 Aug 2024 09:28    TPro  7.3  /  Alb  3.9  /  TBili  0.8  /  DBili  x   /  AST  22  /  ALT  33  /  AlkPhos  80  08-21    LIVER FUNCTIONS - ( 21 Aug 2024 15:50 )  Alb: 3.9 g/dL / Pro: 7.3 gm/dL / ALK PHOS: 80 U/L / ALT: 33 U/L / AST: 22 U/L / GGT: x           PT/INR - ( 21 Aug 2024 15:50 )   PT: 10.8 sec;   INR: 0.95 ratio           Urinalysis Basic - ( 22 Aug 2024 09:28 )    Color: x / Appearance: x / SG: x / pH: x  Gluc: 96 mg/dL / Ketone: x  / Bili: x / Urobili: x   Blood: x / Protein: x / Nitrite: x   Leuk Esterase: x / RBC: x / WBC x   Sq Epi: x / Non Sq Epi: x / Bacteria: x                        14.1   6.69  )-----------( 249      ( 22 Aug 2024 09:28 )             41.7       08-22    145  |  112<H>  |  10  ----------------------------<  96  4.9   |  29  |  1.03    Ca    8.6      22 Aug 2024 09:28    TPro  7.3  /  Alb  3.9  /  TBili  0.8  /  DBili  x   /  AST  22  /  ALT  33  /  AlkPhos  80  08-21      A/P: 34y Male s/p   DVT prophylaxis/OOB  Incentive spirometry  Strict I&O's  Analgesia and antiemetics as needed  Diet  AM labs  
Patient seen at bedside. Patient reports he continues to have severe intermittent right sided flank and abdominal pain. Reports this is his third medical visit for his obstructing stone where the pain is intermittent and not resolving. He denies any fevers, chills, n/v    PE  VITALS  T(C): 36.6 (08-22-24 @ 07:21), Max: 36.8 (08-21-24 @ 15:08)  HR: 63 (08-22-24 @ 07:21) (63 - 85)  BP: 119/70 (08-22-24 @ 07:21) (99/50 - 145/98)  RR: 18 (08-22-24 @ 07:21) (18 - 18)  SpO2: 98% (08-22-24 @ 07:21) (96% - 100%)              Lung    : No resp distress  Abdo:   : Soft, Non tender, No guarding, No distension   Back    : No CVAT b/l  Genitalia Male: No Yates   Neuro   : A&Ox3    LABS                        14.1   6.69  )-----------( 249      ( 22 Aug 2024 09:28 )             41.7   08-22    145  |  112<H>  |  10  ----------------------------<  96  4.9   |  29  |  1.03    Ca    8.6      22 Aug 2024 09:28    TPro  7.3  /  Alb  3.9  /  TBili  0.8  /  DBili  x   /  AST  22  /  ALT  33  /  AlkPhos  80  08-21

## 2024-08-23 NOTE — DISCHARGE NOTE NURSING/CASE MANAGEMENT/SOCIAL WORK - NSDCPEFALRISK_GEN_ALL_CORE
For information on Fall & Injury Prevention, visit: [email or web address] OR  [email or web address] OR  https://www.cdc.gov/steadi/patient.html

## 2024-08-23 NOTE — PROGRESS NOTE ADULT - ASSESSMENT
35 yo male with 7mm distal right ureteral stone with intractable pain.  Discussed cystoscopy, ureteral stent placement with patient, risks, benefits and alternatives which include nephrostomy tube or conservative observation/ medical expulsive therapy. Patient would like to like to proceed with ureteral stent placement at this time.  - Cystoscopy, RIGHT ureteral stent placement   - NPO until procedure   - Strain urine  - Flomax  - Pain control/ IVF  - F/U cultures and sensitivities and treat accordingly  - Patient will follow up outpatient for further stone and stent management    Case discussed with Dr. Banks
A/P:  35 y/o male s/p Right Ureteral Stent placement    Reg diet  Pain Meds PRN  Pt to follow up with Dr. Banks  Above discussed with Dr. Banks

## 2024-08-23 NOTE — DISCHARGE NOTE PROVIDER - NSDCCPTREATMENT_GEN_ALL_CORE_FT
PRINCIPAL PROCEDURE  Procedure: Placement of double-J stents in right ureter  Findings and Treatment:

## 2024-08-23 NOTE — DISCHARGE NOTE PROVIDER - HOSPITAL COURSE
This is a 35 y/o male that came into [facility] ER for right flank pain.  CT of the abdomen and pelvis revealed that he had a right 7mm distal ureteral stone.  Pt was admitted and the next day patient had a cystoscopy/right ureteral stent insertion.  Pt tolerated the procedure well and was placed on a regular diet.  Pt guardado was removed and the patient is now voiding.  Pt will be discharged home today and will follow up with Dr. Banks in the next week      LABS:                          14.1   6.69  )-----------( 249      ( 22 Aug 2024 09:28 )             41.7     08-22    145  |  112<H>  |  10  ----------------------------<  96  4.9   |  29  |  1.03    Ca    8.6      22 Aug 2024 09:28    TPro  7.3  /  Alb  3.9  /  TBili  0.8  /  DBili  x   /  AST  22  /  ALT  33  /  AlkPhos  80  08-21    LIVER FUNCTIONS - ( 21 Aug 2024 15:50 )  Alb: 3.9 g/dL / Pro: 7.3 gm/dL / ALK PHOS: 80 U/L / ALT: 33 U/L / AST: 22 U/L / GGT: x           PT/INR - ( 21 Aug 2024 15:50 )   PT: 10.8 sec;   INR: 0.95 ratio           Urinalysis Basic - ( 22 Aug 2024 09:28 )    Color: x / Appearance: x / SG: x / pH: x  Gluc: 96 mg/dL / Ketone: x  / Bili: x / Urobili: x   Blood: x / Protein: x / Nitrite: x   Leuk Esterase: x / RBC: x / WBC x   Sq Epi: x / Non Sq Epi: x / Bacteria: x

## 2024-08-23 NOTE — DISCHARGE NOTE PROVIDER - NSDCMRMEDTOKEN_GEN_ALL_CORE_FT
dextroamphetamine-amphetamine 20 mg oral tablet: 1 tab(s) orally 2 times a day in the morning and afternoon  Motrin  mg oral tablet: 2 tab(s) orally 1 to 2 times a day as needed for pain   dextroamphetamine-amphetamine 20 mg oral tablet: 1 tab(s) orally 2 times a day in the morning and afternoon  Motrin  mg oral tablet: 2 tab(s) orally 1 to 2 times a day as needed for pain  oxyCODONE 5 mg oral tablet: 1 tab(s) orally every 6 hours as needed for  moderate pain MDD: 4  phenazopyridine 200 mg oral tablet: 1 tab(s) orally every 8 hours MDD: 3

## 2024-08-23 NOTE — DISCHARGE NOTE NURSING/CASE MANAGEMENT/SOCIAL WORK - PATIENT PORTAL LINK FT
You can access the FollowMyHealth Patient Portal offered by [facility] by registering at the following website: [email or web address]. By joining [facility]’s FollowMyHealth portal, you will also be able to view your health information using other applications (apps) compatible with our system.

## 2024-08-23 NOTE — DISCHARGE NOTE PROVIDER - CARE PROVIDER_API CALL
Roman Banks  Urology  [address] [place] [id number]  Phone: [phone]  Fax: [phone]  Follow Up Time:

## 2024-08-29 DIAGNOSIS — N20.1 CALCULUS OF URETER: ICD-10-CM

## 2024-09-03 DIAGNOSIS — Z87.442 PERSONAL HISTORY OF URINARY CALCULI: ICD-10-CM

## 2024-09-03 DIAGNOSIS — Z79.899 OTHER LONG TERM (CURRENT) DRUG THERAPY: ICD-10-CM

## 2024-09-03 DIAGNOSIS — N20.1 CALCULUS OF URETER: ICD-10-CM

## 2024-09-03 DIAGNOSIS — Z91.018 ALLERGY TO OTHER FOODS: ICD-10-CM

## 2024-09-03 DIAGNOSIS — Z90.49 ACQUIRED ABSENCE OF OTHER SPECIFIED PARTS OF DIGESTIVE TRACT: ICD-10-CM

## 2024-09-03 DIAGNOSIS — E66.01 MORBID (SEVERE) OBESITY DUE TO EXCESS CALORIES: ICD-10-CM

## 2024-09-10 ENCOUNTER — APPOINTMENT (OUTPATIENT)
Dept: UROLOGY | Facility: CLINIC | Age: 34
End: 2024-09-10

## 2024-09-18 NOTE — BRIEF OPERATIVE NOTE - NSICDXBRIEFPREOP_GEN_ALL_CORE_FT
[facility] Call Center    Phone Message    May a detailed message be left on voicemail: yes     Reason for Call: Other: Pediatric home service calling says needing orders for speaking valve and tracoe phon assist with o2 port      Action Taken: Other: pu    Travel Screening: Not Applicable     Date of Service:                                                                     
Order for trachoe speaking valve sent to [facility].     Juli Francis, ESDRAS   [facility] Pediatric Pulmonary Care Coordinator   phone: [phone]    
PRE-OP DIAGNOSIS:  Symptomatic cholelithiasis 04-Dec-2019 15:00:25  Joy Leyva

## 2024-09-20 ENCOUNTER — OUTPATIENT (OUTPATIENT)
Dept: OUTPATIENT SERVICES | Facility: HOSPITAL | Age: 34
LOS: 1 days | End: 2024-09-20
Payer: COMMERCIAL

## 2024-09-20 ENCOUNTER — RESULT REVIEW (OUTPATIENT)
Age: 34
End: 2024-09-20

## 2024-09-20 VITALS
RESPIRATION RATE: 16 BRPM | SYSTOLIC BLOOD PRESSURE: 127 MMHG | TEMPERATURE: 98 F | HEIGHT: 67 IN | DIASTOLIC BLOOD PRESSURE: 75 MMHG | OXYGEN SATURATION: 97 % | HEART RATE: 86 BPM | WEIGHT: 235.01 LBS

## 2024-09-20 DIAGNOSIS — Z98.890 OTHER SPECIFIED POSTPROCEDURAL STATES: Chronic | ICD-10-CM

## 2024-09-20 DIAGNOSIS — Z90.49 ACQUIRED ABSENCE OF OTHER SPECIFIED PARTS OF DIGESTIVE TRACT: Chronic | ICD-10-CM

## 2024-09-20 DIAGNOSIS — Z01.818 ENCOUNTER FOR OTHER PREPROCEDURAL EXAMINATION: ICD-10-CM

## 2024-09-20 LAB
ANION GAP SERPL CALC-SCNC: 5 MMOL/L — SIGNIFICANT CHANGE UP (ref 5–17)
APPEARANCE UR: ABNORMAL
APTT BLD: 34.5 SEC — SIGNIFICANT CHANGE UP (ref 24.5–35.6)
BACTERIA # UR AUTO: NEGATIVE /HPF — SIGNIFICANT CHANGE UP
BASOPHILS # BLD AUTO: 0.06 K/UL — SIGNIFICANT CHANGE UP (ref 0–0.2)
BASOPHILS NFR BLD AUTO: 0.7 % — SIGNIFICANT CHANGE UP (ref 0–2)
BILIRUB UR-MCNC: NEGATIVE — SIGNIFICANT CHANGE UP
BLD GP AB SCN SERPL QL: SIGNIFICANT CHANGE UP
BUN SERPL-MCNC: 13 MG/DL — SIGNIFICANT CHANGE UP (ref 7–23)
CALCIUM SERPL-MCNC: 9.5 MG/DL — SIGNIFICANT CHANGE UP (ref 8.5–10.1)
CHLORIDE SERPL-SCNC: 107 MMOL/L — SIGNIFICANT CHANGE UP (ref 96–108)
CO2 SERPL-SCNC: 28 MMOL/L — SIGNIFICANT CHANGE UP (ref 22–31)
COLOR SPEC: YELLOW — SIGNIFICANT CHANGE UP
CREAT SERPL-MCNC: 1.06 MG/DL — SIGNIFICANT CHANGE UP (ref 0.5–1.3)
DIFF PNL FLD: ABNORMAL
EGFR: 93 ML/MIN/1.73M2 — SIGNIFICANT CHANGE UP
EOSINOPHIL # BLD AUTO: 0.2 K/UL — SIGNIFICANT CHANGE UP (ref 0–0.5)
EOSINOPHIL NFR BLD AUTO: 2.2 % — SIGNIFICANT CHANGE UP (ref 0–6)
GLUCOSE SERPL-MCNC: 92 MG/DL — SIGNIFICANT CHANGE UP (ref 70–99)
GLUCOSE UR QL: NEGATIVE MG/DL — SIGNIFICANT CHANGE UP
HCT VFR BLD CALC: 46.8 % — SIGNIFICANT CHANGE UP (ref 39–50)
HGB BLD-MCNC: 16 G/DL — SIGNIFICANT CHANGE UP (ref 13–17)
IMM GRANULOCYTES NFR BLD AUTO: 0.3 % — SIGNIFICANT CHANGE UP (ref 0–0.9)
INR BLD: 0.91 RATIO — SIGNIFICANT CHANGE UP (ref 0.85–1.18)
KETONES UR-MCNC: NEGATIVE MG/DL — SIGNIFICANT CHANGE UP
LEUKOCYTE ESTERASE UR-ACNC: ABNORMAL
LYMPHOCYTES # BLD AUTO: 2.27 K/UL — SIGNIFICANT CHANGE UP (ref 1–3.3)
LYMPHOCYTES # BLD AUTO: 25.3 % — SIGNIFICANT CHANGE UP (ref 13–44)
MCHC RBC-ENTMCNC: 29.7 PG — SIGNIFICANT CHANGE UP (ref 27–34)
MCHC RBC-ENTMCNC: 34.2 GM/DL — SIGNIFICANT CHANGE UP (ref 32–36)
MCV RBC AUTO: 87 FL — SIGNIFICANT CHANGE UP (ref 80–100)
MONOCYTES # BLD AUTO: 0.82 K/UL — SIGNIFICANT CHANGE UP (ref 0–0.9)
MONOCYTES NFR BLD AUTO: 9.2 % — SIGNIFICANT CHANGE UP (ref 2–14)
NEUTROPHILS # BLD AUTO: 5.58 K/UL — SIGNIFICANT CHANGE UP (ref 1.8–7.4)
NEUTROPHILS NFR BLD AUTO: 62.3 % — SIGNIFICANT CHANGE UP (ref 43–77)
NITRITE UR-MCNC: NEGATIVE — SIGNIFICANT CHANGE UP
PH UR: 5 — SIGNIFICANT CHANGE UP (ref 5–8)
PLATELET # BLD AUTO: 322 K/UL — SIGNIFICANT CHANGE UP (ref 150–400)
POTASSIUM SERPL-MCNC: 4.5 MMOL/L — SIGNIFICANT CHANGE UP (ref 3.5–5.3)
POTASSIUM SERPL-SCNC: 4.5 MMOL/L — SIGNIFICANT CHANGE UP (ref 3.5–5.3)
PROT UR-MCNC: 100 MG/DL
PROTHROM AB SERPL-ACNC: 10.3 SEC — SIGNIFICANT CHANGE UP (ref 9.5–13)
RBC # BLD: 5.38 M/UL — SIGNIFICANT CHANGE UP (ref 4.2–5.8)
RBC # FLD: 12.1 % — SIGNIFICANT CHANGE UP (ref 10.3–14.5)
RBC CASTS # UR COMP ASSIST: 159 /HPF — HIGH (ref 0–4)
SODIUM SERPL-SCNC: 140 MMOL/L — SIGNIFICANT CHANGE UP (ref 135–145)
SP GR SPEC: 1.02 — SIGNIFICANT CHANGE UP (ref 1–1.03)
SQUAMOUS # UR AUTO: 12 /HPF — HIGH (ref 0–5)
UROBILINOGEN FLD QL: 1 MG/DL — SIGNIFICANT CHANGE UP (ref 0.2–1)
WBC # BLD: 8.96 K/UL — SIGNIFICANT CHANGE UP (ref 3.8–10.5)
WBC # FLD AUTO: 8.96 K/UL — SIGNIFICANT CHANGE UP (ref 3.8–10.5)
WBC UR QL: 50 /HPF — HIGH (ref 0–5)

## 2024-09-20 PROCEDURE — 71046 X-RAY EXAM CHEST 2 VIEWS: CPT | Mod: 26

## 2024-09-20 PROCEDURE — 86901 BLOOD TYPING SEROLOGIC RH(D): CPT

## 2024-09-20 PROCEDURE — 93010 ELECTROCARDIOGRAM REPORT: CPT

## 2024-09-20 PROCEDURE — 85610 PROTHROMBIN TIME: CPT

## 2024-09-20 PROCEDURE — 87086 URINE CULTURE/COLONY COUNT: CPT

## 2024-09-20 PROCEDURE — 80048 BASIC METABOLIC PNL TOTAL CA: CPT

## 2024-09-20 PROCEDURE — 85730 THROMBOPLASTIN TIME PARTIAL: CPT

## 2024-09-20 PROCEDURE — 86900 BLOOD TYPING SEROLOGIC ABO: CPT

## 2024-09-20 PROCEDURE — 85025 COMPLETE CBC W/AUTO DIFF WBC: CPT

## 2024-09-20 PROCEDURE — 86850 RBC ANTIBODY SCREEN: CPT

## 2024-09-20 PROCEDURE — 81001 URINALYSIS AUTO W/SCOPE: CPT

## 2024-09-20 PROCEDURE — 71046 X-RAY EXAM CHEST 2 VIEWS: CPT

## 2024-09-20 PROCEDURE — 36415 COLL VENOUS BLD VENIPUNCTURE: CPT

## 2024-09-20 PROCEDURE — 93005 ELECTROCARDIOGRAM TRACING: CPT

## 2024-09-20 NOTE — H&P PST ADULT - HISTORY OF PRESENT ILLNESS
This is a 33 y/o male with a PMH of ADHD who was recently admitted to [facility] with kidney stones. At that time he had a stent placed and is now scheduled for a Right ureteroscopy and  laser lithotripsy with possible stent placement. Patient reports + dysuria and hematuria. He denies any SOB, chest pain or palpitations.  This is a 35 y/o male with a PMH of ADHD who was recently admitted to [facility] with kidney stones. At that time patient reports he had a stent placed. He is now scheduled for a Right ureteroscopy and  laser lithotripsy with possible stent placement. Patient reports + dysuria and hematuria. He denies any SOB, chest pain or palpitations.

## 2024-09-20 NOTE — H&P PST ADULT - NSICDXPROCEDURE_GEN_ALL_CORE_FT
PROCEDURES:  Right ureteroscopy 20-Sep-2024 07:43:07  Anjana Crowe  Laser lithotripsy, renal calculus 20-Sep-2024 07:43:20  Anjana Crowe E

## 2024-09-20 NOTE — H&P PST ADULT - ASSESSMENT
This is a 33 y/o male with renal calculi who is scheduled for a Right ureteroscopy and  laser lithotripsy with possible stent placement.    Patient instructed on     1. To follow instructions as per ASU for NPO status   2. Aware that he needs medical clearance (to be done by Dr. Brenner on 9/24/24)

## 2024-09-21 DIAGNOSIS — Z01.818 ENCOUNTER FOR OTHER PREPROCEDURAL EXAMINATION: ICD-10-CM

## 2024-09-21 LAB
CULTURE RESULTS: NO GROWTH — SIGNIFICANT CHANGE UP
SPECIMEN SOURCE: SIGNIFICANT CHANGE UP

## 2024-09-27 ENCOUNTER — APPOINTMENT (OUTPATIENT)
Dept: UROLOGY | Facility: HOSPITAL | Age: 34
End: 2024-09-27

## 2024-09-27 ENCOUNTER — TRANSCRIPTION ENCOUNTER (OUTPATIENT)
Age: 34
End: 2024-09-27

## 2024-09-27 ENCOUNTER — OUTPATIENT (OUTPATIENT)
Dept: INPATIENT UNIT | Facility: HOSPITAL | Age: 34
LOS: 1 days | Discharge: HOME | End: 2024-09-27
Payer: COMMERCIAL

## 2024-09-27 ENCOUNTER — RESULT REVIEW (OUTPATIENT)
Age: 34
End: 2024-09-27

## 2024-09-27 VITALS
HEART RATE: 60 BPM | OXYGEN SATURATION: 100 % | RESPIRATION RATE: 14 BRPM | TEMPERATURE: 99 F | SYSTOLIC BLOOD PRESSURE: 140 MMHG | DIASTOLIC BLOOD PRESSURE: 86 MMHG

## 2024-09-27 VITALS
HEIGHT: 67 IN | TEMPERATURE: 98 F | HEART RATE: 68 BPM | OXYGEN SATURATION: 98 % | SYSTOLIC BLOOD PRESSURE: 113 MMHG | RESPIRATION RATE: 16 BRPM | DIASTOLIC BLOOD PRESSURE: 77 MMHG | WEIGHT: 235.01 LBS

## 2024-09-27 DIAGNOSIS — Z90.49 ACQUIRED ABSENCE OF OTHER SPECIFIED PARTS OF DIGESTIVE TRACT: Chronic | ICD-10-CM

## 2024-09-27 DIAGNOSIS — Z98.890 OTHER SPECIFIED POSTPROCEDURAL STATES: Chronic | ICD-10-CM

## 2024-09-27 DIAGNOSIS — N20.1 CALCULUS OF URETER: ICD-10-CM

## 2024-09-27 PROCEDURE — 88300 SURGICAL PATH GROSS: CPT

## 2024-09-27 PROCEDURE — C1889: CPT

## 2024-09-27 PROCEDURE — 52356 CYSTO/URETERO W/LITHOTRIPSY: CPT | Mod: RT

## 2024-09-27 PROCEDURE — 82365 CALCULUS SPECTROSCOPY: CPT

## 2024-09-27 PROCEDURE — 76000 FLUOROSCOPY <1 HR PHYS/QHP: CPT

## 2024-09-27 PROCEDURE — 88300 SURGICAL PATH GROSS: CPT | Mod: 26

## 2024-09-27 PROCEDURE — C1769: CPT

## 2024-09-27 PROCEDURE — C2617: CPT

## 2024-09-27 RX ORDER — CEFUROXIME SODIUM 1.5 G
1 VIAL (EA) INJECTION
Qty: 6 | Refills: 0
Start: 2024-09-27 | End: 2024-09-29

## 2024-09-27 RX ORDER — OXYBUTYNIN CHLORIDE 5 MG/1
5 TABLET ORAL ONCE
Refills: 0 | Status: COMPLETED | OUTPATIENT
Start: 2024-09-27 | End: 2024-09-27

## 2024-09-27 RX ORDER — DEXTROAMPHETAMINE SACCHARATE, AMPHETAMINE ASPARTATE MONOHYDRATE, DEXTROAMPHETAMINE SULFATE, AMPHETAMINE SULFATE 6.25; 6.25; 6.25; 6.25 MG/1; MG/1; MG/1; MG/1
1 CAPSULE, EXTENDED RELEASE ORAL
Refills: 0 | DISCHARGE

## 2024-09-27 RX ORDER — TRAMADOL HYDROCHLORIDE 200 MG/1
1 TABLET, EXTENDED RELEASE ORAL
Qty: 12 | Refills: 0
Start: 2024-09-27 | End: 2024-09-29

## 2024-09-27 RX ORDER — PHENAZOPYRIDINE HCL 100 MG
1 TABLET ORAL
Qty: 9 | Refills: 0
Start: 2024-09-27 | End: 2024-09-29

## 2024-09-27 RX ORDER — PHENAZOPYRIDINE HCL 100 MG
200 TABLET ORAL ONCE
Refills: 0 | Status: COMPLETED | OUTPATIENT
Start: 2024-09-27 | End: 2024-09-27

## 2024-09-27 RX ORDER — ONDANSETRON 2 MG/ML
4 INJECTION, SOLUTION INTRAMUSCULAR; INTRAVENOUS ONCE
Refills: 0 | Status: DISCONTINUED | OUTPATIENT
Start: 2024-09-27 | End: 2024-09-27

## 2024-09-27 RX ORDER — FENTANYL CITRATE 50 UG/ML
50 INJECTION INTRAMUSCULAR; INTRAVENOUS
Refills: 0 | Status: DISCONTINUED | OUTPATIENT
Start: 2024-09-27 | End: 2024-09-27

## 2024-09-27 RX ORDER — DEXTROAMPHETAMINE SACCHARATE, AMPHETAMINE ASPARTATE MONOHYDRATE, DEXTROAMPHETAMINE SULFATE, AMPHETAMINE SULFATE 6.25; 6.25; 6.25; 6.25 MG/1; MG/1; MG/1; MG/1
1.5 CAPSULE, EXTENDED RELEASE ORAL
Refills: 0 | DISCHARGE

## 2024-09-27 RX ORDER — OXYBUTYNIN CHLORIDE 5 MG/1
1 TABLET ORAL
Qty: 21 | Refills: 0
Start: 2024-09-27 | End: 2024-10-03

## 2024-09-27 RX ORDER — HYDROMORPHONE HYDROCHLORIDE 2 MG/1
0.5 TABLET ORAL
Refills: 0 | Status: DISCONTINUED | OUTPATIENT
Start: 2024-09-27 | End: 2024-09-27

## 2024-09-27 RX ORDER — OXYCODONE HYDROCHLORIDE 5 MG/1
5 TABLET ORAL ONCE
Refills: 0 | Status: DISCONTINUED | OUTPATIENT
Start: 2024-09-27 | End: 2024-09-27

## 2024-09-27 RX ADMIN — OXYBUTYNIN CHLORIDE 5 MILLIGRAM(S): 5 TABLET ORAL at 09:38

## 2024-09-27 RX ADMIN — Medication 200 MILLIGRAM(S): at 09:38

## 2024-09-27 NOTE — ASU DISCHARGE PLAN (ADULT/PEDIATRIC) - NURSING INSTRUCTIONS
For any problems or concerns,contact your doctor. [facility] patients should call the [facility]. If you cannot reach the doctor or clinic, call [facility] Emergency Department at [phone] or go to your local Emergency Department.  A responsible adult should be with you for the rest of the day and night for your safety and to help you if you needed. Resume your medications as listed on the attached Medication Record. Begin with liquids and light food ( tea, toast, Jello, soups). Advance to what you normally eat. Liquids should taken in adequate amounts today.     CALL the DOCTOR:    -Fever greater than  101F  - Signs  of infection such as : increase pain,swelling,redness,or a bad  smell coming from the wound.  -Excessive amount of bleeding.  - Any pain that appears to be getting worse.  - Vomiting  -  If you have  not urinated 8 hours after surgery or have any difficulty urinating.     A responsible adult should be with you for the rest of the day and night for your safety and to help you if you needed.    Review attached FACT SHEET if applicable.

## 2024-09-27 NOTE — ASU PREOP CHECKLIST - NOTHING BY MOUTH SINCE
Is This A New Presentation, Or A Follow-Up?: Follow Up Atopic Dermatitis Additional History: Patient states she was not able to fill dupixent due to not following up in over a year. States she did very well on therapy and wishes to restart. 27-Sep-2024 00:00

## 2024-09-27 NOTE — ASU DISCHARGE PLAN (ADULT/PEDIATRIC) - PROCEDURE
Cystoscopy, RIGHT ureteroscopy, laser lithotripsy, basket stone extraction, RIGHT ureteral stent exchange

## 2024-09-27 NOTE — BRIEF OPERATIVE NOTE - OPERATION/FINDINGS
RIGHT ureteral stone fragmented and extracted with basket, replacement RIGHT ureteral stent in good position

## 2024-09-27 NOTE — ASU DISCHARGE PLAN (ADULT/PEDIATRIC) - CARE PROVIDER_API CALL
Chris Carrasco Anastacio  Urology  [address]  [place] [address]  Phone: [phone]  Fax: [phone]  Follow Up Time: 1 week

## 2024-09-27 NOTE — BRIEF OPERATIVE NOTE - NSICDXBRIEFPROCEDURE_GEN_ALL_CORE_FT
PROCEDURES:  Ureteroscopy with laser lithotripsy and stent placement 27-Sep-2024 08:42:30  Chris Carrasco

## 2024-09-30 DIAGNOSIS — N20.1 CALCULUS OF URETER: ICD-10-CM

## 2024-09-30 LAB — SURGICAL PATHOLOGY STUDY: SIGNIFICANT CHANGE UP

## 2024-10-03 ENCOUNTER — APPOINTMENT (OUTPATIENT)
Dept: UROLOGY | Facility: CLINIC | Age: 34
End: 2024-10-03
Payer: COMMERCIAL

## 2024-10-03 VITALS
RESPIRATION RATE: 16 BRPM | OXYGEN SATURATION: 98 % | HEIGHT: 68 IN | SYSTOLIC BLOOD PRESSURE: 113 MMHG | WEIGHT: 235 LBS | DIASTOLIC BLOOD PRESSURE: 79 MMHG | BODY MASS INDEX: 35.61 KG/M2 | HEART RATE: 95 BPM

## 2024-10-03 DIAGNOSIS — N20.0 CALCULUS OF KIDNEY: ICD-10-CM

## 2024-10-03 PROCEDURE — 52000 CYSTOURETHROSCOPY: CPT

## 2024-10-07 LAB
CELL MATERIAL STONE EST-MCNT: SIGNIFICANT CHANGE UP
LABORATORY COMMENT REPORT: SIGNIFICANT CHANGE UP
NIDUS STONE QN: SIGNIFICANT CHANGE UP

## 2025-08-08 PROBLEM — N20.0 CALCULUS OF KIDNEY: Chronic | Status: ACTIVE | Noted: 2024-09-20

## 2025-08-15 ENCOUNTER — APPOINTMENT (OUTPATIENT)
Dept: ORTHOPEDIC SURGERY | Facility: CLINIC | Age: 35
End: 2025-08-15
Payer: COMMERCIAL

## 2025-08-15 VITALS — HEIGHT: 68 IN

## 2025-08-15 DIAGNOSIS — S69.92XD UNSPECIFIED INJURY OF LEFT WRIST, HAND AND FINGER(S), SUBSEQUENT ENCOUNTER: ICD-10-CM

## 2025-08-15 PROCEDURE — 73130 X-RAY EXAM OF HAND: CPT | Mod: LT

## 2025-08-15 PROCEDURE — 99204 OFFICE O/P NEW MOD 45 MIN: CPT

## 2025-08-22 ENCOUNTER — OUTPATIENT (OUTPATIENT)
Dept: OUTPATIENT SERVICES | Facility: HOSPITAL | Age: 35
LOS: 1 days | End: 2025-08-22
Payer: COMMERCIAL

## 2025-08-22 ENCOUNTER — APPOINTMENT (OUTPATIENT)
Dept: MRI IMAGING | Facility: CLINIC | Age: 35
End: 2025-08-22
Payer: COMMERCIAL

## 2025-08-22 DIAGNOSIS — S69.92XD UNSPECIFIED INJURY OF LEFT WRIST, HAND AND FINGER(S), SUBSEQUENT ENCOUNTER: ICD-10-CM

## 2025-08-22 DIAGNOSIS — Z98.890 OTHER SPECIFIED POSTPROCEDURAL STATES: Chronic | ICD-10-CM

## 2025-08-22 DIAGNOSIS — Z90.49 ACQUIRED ABSENCE OF OTHER SPECIFIED PARTS OF DIGESTIVE TRACT: Chronic | ICD-10-CM

## 2025-08-22 PROCEDURE — 73218 MRI UPPER EXTREMITY W/O DYE: CPT

## 2025-08-22 PROCEDURE — 73218 MRI UPPER EXTREMITY W/O DYE: CPT | Mod: 26,LT

## 2025-08-26 ENCOUNTER — NON-APPOINTMENT (OUTPATIENT)
Age: 35
End: 2025-08-26

## 2025-09-17 ENCOUNTER — NON-APPOINTMENT (OUTPATIENT)
Age: 35
End: 2025-09-17

## 2025-09-17 ENCOUNTER — APPOINTMENT (OUTPATIENT)
Dept: ORTHOPEDIC SURGERY | Facility: CLINIC | Age: 35
End: 2025-09-17